# Patient Record
Sex: MALE | Race: WHITE | Employment: FULL TIME
[De-identification: names, ages, dates, MRNs, and addresses within clinical notes are randomized per-mention and may not be internally consistent; named-entity substitution may affect disease eponyms.]

---

## 2022-03-18 PROBLEM — J30.9 ALLERGIC RHINITIS: Status: ACTIVE | Noted: 2020-07-20

## 2022-03-18 PROBLEM — R51.9 NONINTRACTABLE HEADACHE: Status: ACTIVE | Noted: 2022-01-21

## 2022-03-18 PROBLEM — R09.82 POSTNASAL DRIP: Status: ACTIVE | Noted: 2022-01-21

## 2022-03-18 PROBLEM — J32.9 SINUSITIS: Status: ACTIVE | Noted: 2022-01-21

## 2022-03-19 PROBLEM — Z13.228 SCREENING FOR METABOLIC DISORDER: Status: ACTIVE | Noted: 2020-06-22

## 2022-03-19 PROBLEM — E78.5 HYPERLIPIDEMIA: Status: ACTIVE | Noted: 2021-07-13

## 2022-03-19 PROBLEM — J06.9 UPPER RESPIRATORY TRACT INFECTION: Status: ACTIVE | Noted: 2020-07-20

## 2022-03-19 PROBLEM — F98.8 ATTENTION DEFICIT DISORDER (ADD) WITHOUT HYPERACTIVITY: Status: ACTIVE | Noted: 2019-03-24

## 2022-03-19 PROBLEM — Z71.3 DIETARY COUNSELING: Status: ACTIVE | Noted: 2020-06-22

## 2022-03-19 PROBLEM — F33.9 EPISODE OF RECURRENT MAJOR DEPRESSIVE DISORDER (HCC): Status: ACTIVE | Noted: 2020-06-22

## 2022-03-19 PROBLEM — R03.0 ELEVATED BP WITHOUT DIAGNOSIS OF HYPERTENSION: Status: ACTIVE | Noted: 2020-08-18

## 2022-03-19 PROBLEM — D17.9 MULTIPLE LIPOMAS: Status: ACTIVE | Noted: 2017-07-03

## 2022-03-19 PROBLEM — G44.221 CHRONIC TENSION-TYPE HEADACHE, INTRACTABLE: Status: ACTIVE | Noted: 2020-08-18

## 2022-03-20 PROBLEM — Z23 ENCOUNTER FOR IMMUNIZATION: Status: ACTIVE | Noted: 2020-08-18

## 2022-03-20 PROBLEM — R09.81 SINUS CONGESTION: Status: ACTIVE | Noted: 2020-08-18

## 2022-03-20 PROBLEM — Z20.822 CLOSE EXPOSURE TO COVID-19 VIRUS: Status: ACTIVE | Noted: 2020-06-22

## 2022-05-31 ENCOUNTER — TELEPHONE (OUTPATIENT)
Dept: PRIMARY CARE CLINIC | Facility: CLINIC | Age: 47
End: 2022-05-31

## 2022-05-31 NOTE — TELEPHONE ENCOUNTER
PT next apt on 06- pt is needing to be seen before 9 am do to his job , please call pt back to confirm apt thank you.

## 2022-06-14 ENCOUNTER — TELEPHONE (OUTPATIENT)
Dept: BEHAVIORAL/MENTAL HEALTH CLINIC | Age: 47
End: 2022-06-14

## 2022-06-14 NOTE — TELEPHONE ENCOUNTER
Received signed records request for the last 6-8 visit records, current medication list, and formal diagnostic test for ADHD to be sent to [facility] (fax) [phone]

## 2022-06-15 NOTE — TELEPHONE ENCOUNTER
Did not do a formal diagnostic test for ADHD but will send the records. Will print the records in my office.  Thanks

## 2022-06-17 ENCOUNTER — TELEMEDICINE (OUTPATIENT)
Dept: BEHAVIORAL/MENTAL HEALTH CLINIC | Age: 47
End: 2022-06-17

## 2022-06-17 ENCOUNTER — TELEPHONE (OUTPATIENT)
Dept: BEHAVIORAL/MENTAL HEALTH CLINIC | Age: 47
End: 2022-06-17

## 2022-06-17 DIAGNOSIS — F33.42 RECURRENT MAJOR DEPRESSIVE DISORDER, IN FULL REMISSION (HCC): ICD-10-CM

## 2022-06-17 DIAGNOSIS — F98.8 ATTENTION DEFICIT DISORDER, UNSPECIFIED HYPERACTIVITY PRESENCE: Primary | ICD-10-CM

## 2022-06-17 DIAGNOSIS — F41.9 ANXIETY DISORDER, UNSPECIFIED TYPE: ICD-10-CM

## 2022-06-17 DIAGNOSIS — F43.10 PTSD (POST-TRAUMATIC STRESS DISORDER): ICD-10-CM

## 2022-06-17 PROCEDURE — 99214 OFFICE O/P EST MOD 30 MIN: CPT | Performed by: PSYCHIATRY & NEUROLOGY

## 2022-06-17 ASSESSMENT — PATIENT HEALTH QUESTIONNAIRE - PHQ9
5. POOR APPETITE OR OVEREATING: 0
6. FEELING BAD ABOUT YOURSELF - OR THAT YOU ARE A FAILURE OR HAVE LET YOURSELF OR YOUR FAMILY DOWN: 0
8. MOVING OR SPEAKING SO SLOWLY THAT OTHER PEOPLE COULD HAVE NOTICED. OR THE OPPOSITE, BEING SO FIGETY OR RESTLESS THAT YOU HAVE BEEN MOVING AROUND A LOT MORE THAN USUAL: 0
3. TROUBLE FALLING OR STAYING ASLEEP: 0
10. IF YOU CHECKED OFF ANY PROBLEMS, HOW DIFFICULT HAVE THESE PROBLEMS MADE IT FOR YOU TO DO YOUR WORK, TAKE CARE OF THINGS AT HOME, OR GET ALONG WITH OTHER PEOPLE: 0
SUM OF ALL RESPONSES TO PHQ QUESTIONS 1-9: 0
SUM OF ALL RESPONSES TO PHQ QUESTIONS 1-9: 0
SUM OF ALL RESPONSES TO PHQ9 QUESTIONS 1 & 2: 0
4. FEELING TIRED OR HAVING LITTLE ENERGY: 0
1. LITTLE INTEREST OR PLEASURE IN DOING THINGS: 0
7. TROUBLE CONCENTRATING ON THINGS, SUCH AS READING THE NEWSPAPER OR WATCHING TELEVISION: 0
SUM OF ALL RESPONSES TO PHQ QUESTIONS 1-9: 0
2. FEELING DOWN, DEPRESSED OR HOPELESS: 0
SUM OF ALL RESPONSES TO PHQ QUESTIONS 1-9: 0
9. THOUGHTS THAT YOU WOULD BE BETTER OFF DEAD, OR OF HURTING YOURSELF: 0

## 2022-06-17 ASSESSMENT — ANXIETY QUESTIONNAIRES
5. BEING SO RESTLESS THAT IT IS HARD TO SIT STILL: 2
2. NOT BEING ABLE TO STOP OR CONTROL WORRYING: 1
3. WORRYING TOO MUCH ABOUT DIFFERENT THINGS: 1
7. FEELING AFRAID AS IF SOMETHING AWFUL MIGHT HAPPEN: 0
GAD7 TOTAL SCORE: 4
4. TROUBLE RELAXING: 0
1. FEELING NERVOUS, ANXIOUS, OR ON EDGE: 0
IF YOU CHECKED OFF ANY PROBLEMS ON THIS QUESTIONNAIRE, HOW DIFFICULT HAVE THESE PROBLEMS MADE IT FOR YOU TO DO YOUR WORK, TAKE CARE OF THINGS AT HOME, OR GET ALONG WITH OTHER PEOPLE: NOT DIFFICULT AT ALL
6. BECOMING EASILY ANNOYED OR IRRITABLE: 0

## 2022-06-17 NOTE — TELEPHONE ENCOUNTER
Fax to [facility] did not go through. Patient spoke with Dr Violet Cruz today and she advised that it is not necessary to send the records. Will not try to refax.

## 2022-06-17 NOTE — LETTER
[facility] [address] PRIMARY CARE [facility] [address] [address]  Phone: [phone]  Fax: [phone]    Maria Elena Seaman MD        June 17, 2022     Patient: Lord Kingston   YOB: 1975                Current Outpatient Medications   Medication Sig Dispense Refill    NONFORMULARY Athletic Greens      lisdexamfetamine (VYVANSE) 70 MG capsule Take 70 mg by mouth daily.  ZINC PO Take by mouth (Patient not taking: Reported on 6/17/2022)      amphetamine-dextroamphetamine (ADDERALL) 10 MG tablet Take 10 mg by mouth 2 times daily. (Patient not taking: Reported on 6/17/2022)      ascorbic acid (VITAMIN C) 1000 MG tablet Take 1,000 mg by mouth daily (Patient not taking: Reported on 6/17/2022)      cyanocobalamin 100 MCG tablet Take 100 mcg by mouth daily (Patient not taking: Reported on 6/17/2022)      ergocalciferol (ERGOCALCIFEROL) 1.25 MG (48975 UT) capsule Take 50,000 Units by mouth (Patient not taking: Reported on 6/17/2022)      loratadine-pseudoephedrine (CLARITIN-D 24HR)  MG per extended release tablet Take 1 tablet by mouth daily as needed (Patient not taking: Reported on 6/17/2022)       No current facility-administered medications for this visit.

## 2022-06-17 NOTE — PROGRESS NOTES
Patient:  Rosa M Magdaleno  Age:  42 y.o.  :  1975     SEX:  male MRN:  [id number]     RACE: [unfilled]     SEEN:  [x]  PATIENT  []  SPOUSE []  OTHER:              PHQ-9  2022 3/22/2022 3/22/2022   Little interest or pleasure in doing things 0 - -   Little interest or pleasure in doing things - 0 0   Feeling down, depressed, or hopeless 0 - -   Trouble falling or staying asleep, or sleeping too much 0 - -   Trouble falling or staying asleep, or sleeping too much - - 0   Feeling tired or having little energy 0 - -   Feeling tired or having little energy - - 0   Poor appetite or overeating 0 - -   Poor appetite, weight loss, or overeating - - 0   Feeling bad about yourself - or that you are a failure or have let yourself or your family down 0 - -   Feeling bad about yourself - or that you are a failure or have let yourself or your family down - - 0   Trouble concentrating on things, such as reading the newspaper or watching television 0 - -   Trouble concentrating on things such as school, work, reading, or watching TV - - 0   Moving or speaking so slowly that other people could have noticed. Or the opposite - being so fidgety or restless that you have been moving around a lot more than usual 0 - -   Moving or speaking so slowly that other people could have noticed; or the opposite being so fidgety that others notice - - 0   Thoughts that you would be better off dead, or of hurting yourself in some way 0 - -   Thoughts of being better off dead, or hurting yourself in some way - - 0   PHQ-2 Score 0 - -   Total Score PHQ 2 - 0 0   PHQ-9 Total Score 0 - -   PHQ 9 Score - - 0   If you checked off any problems, how difficult have these problems made it for you to do your work, take care of things at home, or get along with other people?  0 - -   How difficult have these problems made it for you to do your work, take care of your home and get along with others - - Not difficult at all       BRENDON-7 SCREENING 6/17/2022   Feeling nervous, anxious, or on edge Not at all   Not being able to stop or control worrying Several days   Worrying too much about different things Several days   Trouble relaxing Not at all   Being so restless that it is hard to sit still More than half the days   Becoming easily annoyed or irritable Not at all   Feeling afraid as if something awful might happen Not at all   BRENDON-7 Total Score 4   How difficult have these problems made it for you to do your work, take care of things at home, or get along with other people? Not difficult at all        I was at home while conducting this encounter. Consent:  He and/or his healthcare decision maker is aware that this patient-initiated Telehealth encounter is a billable service, with coverage as determined by his insurance carrier. He is aware that he may receive a bill and has provided verbal consent to proceed: YesPatient identification was verified, and a caregiver was present when appropriate. The patient was located in a state where the provider was credentialed to provide care. This virtual visit was conducted via [place]. Pursuant to the emergency declaration under the [facility], [facility] waiver authority and the [employer] Act, this Virtual  Visit was conducted to reduce the patient's risk of exposure to COVID-19 and provide continuity of care for an established patient. Services were provided through a video synchronous discussion virtually to substitute for in-person clinic visit. Due to this being a TeleHealth evaluation, many elements of the physical examination are unable to be assessed. Total Time: minutes: 11-20 minutes. Chief complaint:  Pt says he has been doing well on Vyvanse. Subjective:  Seen virtually for follow-up. States things are going pretty well. He is doing well on Vyvanse.   States he saw a new primary care physician at [facility] and his blood pressure was high in January. They referred him to their psychiatrist which she does not want to change to a psychiatrist at [facility]. States he is trying not to drink. Drinks socially and to limit. He is a runner and has been exercising every day. Him and his partner have changed diets so cut out on sodium. They are not very big unprocessed foods and have cut down red meat. Partner had a bad injury recently and is recovering from that. He is attending physical therapy and seeing a therapist for addiction. They are getting along well. Supportive psychotherapy provided. Denies suicidal ideation/homicidal ideations. Denies symptoms psychosis. Continue Vyvanse at current dose. He had an EKG done in January work which was within normal limits. States has been keeping a log of blood pressure and its been normal.    Patient Active Problem List   Diagnosis    Sinusitis    Postnasal drip    Allergic rhinitis    Nonintractable headache    Elevated BP without diagnosis of hypertension    Attention deficit disorder (ADD) without hyperactivity    Screening for metabolic disorder    Episode of recurrent major depressive disorder ([facility] [address]    Upper respiratory tract infection    Chronic tension-type headache, intractable    Hyperlipidemia    Dietary counseling    Multiple lipomas    Sinus congestion    Close exposure to COVID-19 virus    Encounter for immunization       Denies palpitation,SOB, Chest pain, headaches. In no acute distress. MEDICATION REVIEW:    Current Medications:    Current Outpatient Medications   Medication Sig    NONFORMULARY Athletic Greens    lisdexamfetamine (VYVANSE) 70 MG capsule Take 1 capsule by mouth daily for 30 days.  ZINC PO Take by mouth (Patient not taking: Reported on 6/17/2022)    amphetamine-dextroamphetamine (ADDERALL) 10 MG tablet Take 10 mg by mouth 2 times daily.  (Patient not taking: Reported on 6/17/2022)    ascorbic acid (VITAMIN C) 1000 MG tablet Take 1,000 mg by mouth daily (Patient not taking: Reported on 6/17/2022)    cyanocobalamin 100 MCG tablet Take 100 mcg by mouth daily (Patient not taking: Reported on 6/17/2022)    ergocalciferol (ERGOCALCIFEROL) 1.25 MG (10375 UT) capsule Take 50,000 Units by mouth (Patient not taking: Reported on 6/17/2022)    loratadine-pseudoephedrine (CLARITIN-D 24HR)  MG per extended release tablet Take 1 tablet by mouth daily as needed (Patient not taking: Reported on 6/17/2022)     No current facility-administered medications for this visit. Allergies   Allergen Reactions    Escitalopram Other (See Comments)     Headaches     Bupropion Other (See Comments)     headache    Cyclobenzaprine Itching and Other (See Comments)     Also some RLS    Other      cats       Past Medical History, Past Surgical History, Family history, Social History, and Medications were all reviewed with the patient today and updated as necessary.      Compliant with medication: Yes   Side effects from medications:  No     EXAMINATION  Musculoskeletal    GAIT AND STATION   [x] WNL   [] RESTRICTED   [] UNSTEADY WALK        [] ABNORMAL   [] UNBALANCED         PSYCHIATRIC     GENERAL APPEARANCE:   [x]  WELL GROOMED []     DISHEVELED   []  UNKEMPT      []  UNUSUAL/BIZZARE    [] WNL       ATTITUDE:   [x] COOPERATIVE   [] GUARDED   [] SUSPICIOUS      [] HOSTILE                            BEHAVIOR:   [x] CALM   [] HYPERACTIVE   [] MANNERISMS      [] BIZZARE         SPEECH:   [x] NORMAL FOR CLIENT   [] SPONTANEOUS   [] SLURRED   [] WHISPERING      [] LOUD   [] PRESSURED   [] ARTICULATE       EYE CONTACT:   [x] WNL   [] BLANK STARE   [] INTENSE      [] AVOIDANT         MOOD:   [x] EUTHYMIC   [] ANXIOUS   [] DEPRESSED      [] IRRITABLE   [] ANGRY   [] APATHETIC     AFFECT:   [x] CONGRUENT WITH MOOD   [] FLAT   [] CONSTRICTED      [] INAPPROPRIATE   [] LABILE           THOUGHT PROCESS:   [x] LOGICAL/GOAL-DIRECTED [] FOI   [] CIRCUMSANTIAL      [] INCOHERENT   [] TANGENTIAL   [] CONCRETE      [] PERSEVERATION           THOUGHT CONTENT:                DELUSIONS  [x] DENIES  [] GRANDIOSE  [] PERSECUTORY  [] Muslim  [] REFERENCE   HALLUCINATIONS  [x] DENIES  [] AUDITORY  [] VISUAL  [] OLFACTORY  [] TACTILE     [] GUSTATORY  [] SOMATIC         OBSESSIONS  [x] DENIES  [] PRESENT         SUICIDAL IDEATION  [x] DENIES  [] PRESENT W/O PLAN  [] PRESENT W/ PLAN       HOMICIDAL IDEATION  [x] DENIES  [] PRESENT W/O PLAN  [] PRESENT W/ PLAN           JUDGEMENT:   [x] GOOD   [] FAIR   [] POOR   INSIGHT:   [x] GOOD   [] FAIR   [] POOR     COGNITION:           SENSORIUM:   [x] ALERT   [] CLOUDED   [] DROWSY     ORIENTATION:   [x] INTACT   [] TIME:  PLACE  PERSON   RECENT & REMOTE MEMORY:   [] NORMAL   [x] OTHER:                  ATTENTION:   [x] INTACT   [] MILD IMPAIRMENT   [] SEVERE IMPAIRMENT     CONCENTRATION:   [x] INTACT   [] MILD IMPAIRMENT   [] SEVERE IMPAIRMENT     LANGUAGE:   [x] AVERAGE   [] ABOVE AVERAGE   [] BELOW AVERAGE     FUND OF KNOWLEDGE:   [] UNABLE TO ASSESS AT THIS TIME   [x] AVERAGE   [] ABOVE AVERAGE   [] BELOW AVERAGE      [] GOOD TO EXCELLENT KNOWLEDGE OF CURRENT EVENTS   [] POOR TO NO KNLEDGE OF CURRENT EVENTS           ABNORMAL MOVEMENTS:   [x] NONE   [] TICS   [] TREMORS   [] BIZZARE      [] FACE   [] TRUNK   [] EXTREMETIES   [] GESTURES        SLEEP:   [x] GOOD   [] FAIR   [] POOR      MUSCLE STRENGTH AND TONE   [x] WNL   [] ATROPHY   [] SPASTIC        [] FLACCID   [] COGWHEEL         Diagnoses/Impressions:    ICD-10-CM    1. Attention deficit disorder, unspecified hyperactivity presence  F98.8 lisdexamfetamine (VYVANSE) 70 MG capsule   2. Anxiety disorder, unspecified type  F41.9    3. Recurrent major depressive disorder, in full remission ([facility] [address]  F33.42    4.  PTSD (post-traumatic stress disorder)  F43.10        TREATMENT GOALS:  Symptom reduction, Medication adherence, maintain therapeutic of meds, risk of bleeding, risk of seizures, addiction potential, memory impairment, high blood pressure, dizziness, drowsiness, sedation , risk of falls, Risk & benefits discussed: including but not limited to possible off-label medication usage. [x] Follow MSE for sxs improvement     I have reviewed the patients controlled substance prescription history, as maintained in the [place] prescription monitoring program, so that the prescription(s) for a  controlled substance can be given. Recommendations and Referrals: Follow up with : MD, requires monitoring of response to medication, requires monitoring of medication side effects. Time until next PMA:     Follow up with Mental Health Clinicians: psychotherapy interventions, improve level of functioning, monitoring to prevent decompensation /hospitalization, monitoring to maintain therapeutic gains, symptoms (resolving and controlled)           Psychotherapy note:                                __10_ Minutes of psychotherapy     [x]  Supportive psychotherapy, Patient discussed certain situational and personal stressors ongoing in his life at this time, weight management d/w the patient. Sleep hygiene d/w patient. Patient allowed to vent out his emotions. Scenarios were reviewed using role playing and CBT techniques in order to increase insight and decrease anxiety. []  Disposition planning      []  Dangerous and will not contract for safety in the community    **Pateint has been notified: They are to call 911 or go to their nearest E.R. if they are experiencing a medical emergency or suicidal ideations/homicidal ideations. **  All ancillary documentation entered reviewed by provider. PLEASE NOTE:  This document has been produced in part or whole using voice recognition software. Proofread however unrecognized errors in transcription may be present.         Manolo Pack MD

## 2022-06-23 ENCOUNTER — TELEPHONE (OUTPATIENT)
Dept: BEHAVIORAL/MENTAL HEALTH CLINIC | Age: 47
End: 2022-06-23

## 2022-07-14 ENCOUNTER — TELEPHONE (OUTPATIENT)
Dept: PRIMARY CARE CLINIC | Facility: CLINIC | Age: 47
End: 2022-07-14

## 2022-07-14 NOTE — TELEPHONE ENCOUNTER
----- Message from Melonie Melendrez sent at 7/13/2022  9:41 AM EDT -----  Subject: Medication Problem    Medication: lisdexamfetamine (VYVANSE) 70 MG capsule  Dosage: 70 mg capsule  Ordering Provider: Michael Soares    Question/Problem: Assistance that was applied for with nurse Eron Atkins was   not approved and patient needs a different medication prescribed that will   be a lower cost.    Pharmacy: [facility] [address] [place] [address] [place] Jackson, [address] [place] [address] [phone]    ---------------------------------------------------------------------------  --------------  [address]  [phone]; OK to leave message on voicemail  ---------------------------------------------------------------------------  --------------    SCRIPT ANSWERS  Relationship to Patient: Self

## 2022-07-15 NOTE — TELEPHONE ENCOUNTER
Have spoken with patient and with Marva Jung. Jen needed a box filled in on the application, refaxed the application to them. Followed up this morning and it is in process still. They advised to check back end of day. Notified patient.

## 2022-07-27 ENCOUNTER — TELEPHONE (OUTPATIENT)
Dept: BEHAVIORAL/MENTAL HEALTH CLINIC | Age: 47
End: 2022-07-27

## 2022-07-27 NOTE — TELEPHONE ENCOUNTER
Called help at hand patient assistance to follow up on application for vyvanse. Was advised it was approved. Followed up with patient, he was not able to get his medication. He advised the pharmacy was being difficult and not processing it as it should be. ID [id number]  BIN [id number]  PCN [id number]  GRP [id number]    Customer service: [phone]    I called the pharmacy and provided them the information and they processed it and advised it is ready for  and no charge. Notified patient.

## 2022-09-02 ENCOUNTER — TELEPHONE (OUTPATIENT)
Dept: PRIMARY CARE CLINIC | Facility: CLINIC | Age: 47
End: 2022-09-02

## 2022-09-02 NOTE — TELEPHONE ENCOUNTER
Returned call to patient. Has had a lot of increased life altering anxiety and depression. Would like to possibly add a depression medication and something for anxiety at this point. Stated that he has tried this in the past but never gave it long enough to get into his system. Has allergy to Wellbutrin and Lexapro. Patient will need refill sent for vyvanse to last until appt on 9/20 if he is not seen sooner. Pharmacy was also changed to [facility] going forward. Denies SI/HI. Provided with crisis line information in the event of emergency.

## 2022-09-03 DIAGNOSIS — F98.8 ATTENTION DEFICIT DISORDER, UNSPECIFIED HYPERACTIVITY PRESENCE: ICD-10-CM

## 2022-09-03 RX ORDER — SERTRALINE HYDROCHLORIDE 25 MG/1
TABLET, FILM COATED ORAL
Qty: 30 TABLET | Refills: 3 | Status: SHIPPED | OUTPATIENT
Start: 2022-09-03 | End: 2022-09-20 | Stop reason: SDUPTHER

## 2022-09-20 ENCOUNTER — OFFICE VISIT (OUTPATIENT)
Dept: BEHAVIORAL/MENTAL HEALTH CLINIC | Age: 47
End: 2022-09-20
Payer: COMMERCIAL

## 2022-09-20 VITALS
BODY MASS INDEX: 25.37 KG/M2 | SYSTOLIC BLOOD PRESSURE: 126 MMHG | HEIGHT: 73 IN | HEART RATE: 83 BPM | TEMPERATURE: 98 F | DIASTOLIC BLOOD PRESSURE: 78 MMHG | OXYGEN SATURATION: 98 % | WEIGHT: 191.4 LBS

## 2022-09-20 DIAGNOSIS — F33.42 RECURRENT MAJOR DEPRESSIVE DISORDER, IN FULL REMISSION (HCC): Primary | ICD-10-CM

## 2022-09-20 DIAGNOSIS — F41.9 ANXIETY DISORDER, UNSPECIFIED TYPE: ICD-10-CM

## 2022-09-20 DIAGNOSIS — F98.8 ATTENTION DEFICIT DISORDER, UNSPECIFIED HYPERACTIVITY PRESENCE: ICD-10-CM

## 2022-09-20 DIAGNOSIS — F43.10 PTSD (POST-TRAUMATIC STRESS DISORDER): ICD-10-CM

## 2022-09-20 PROCEDURE — 99214 OFFICE O/P EST MOD 30 MIN: CPT | Performed by: PSYCHIATRY & NEUROLOGY

## 2022-09-20 PROCEDURE — G8427 DOCREV CUR MEDS BY ELIG CLIN: HCPCS | Performed by: PSYCHIATRY & NEUROLOGY

## 2022-09-20 ASSESSMENT — ANXIETY QUESTIONNAIRES
7. FEELING AFRAID AS IF SOMETHING AWFUL MIGHT HAPPEN: 0
4. TROUBLE RELAXING: 0
2. NOT BEING ABLE TO STOP OR CONTROL WORRYING: 1
GAD7 TOTAL SCORE: 2
3. WORRYING TOO MUCH ABOUT DIFFERENT THINGS: 1
5. BEING SO RESTLESS THAT IT IS HARD TO SIT STILL: 0
1. FEELING NERVOUS, ANXIOUS, OR ON EDGE: 0
IF YOU CHECKED OFF ANY PROBLEMS ON THIS QUESTIONNAIRE, HOW DIFFICULT HAVE THESE PROBLEMS MADE IT FOR YOU TO DO YOUR WORK, TAKE CARE OF THINGS AT HOME, OR GET ALONG WITH OTHER PEOPLE: NOT DIFFICULT AT ALL
6. BECOMING EASILY ANNOYED OR IRRITABLE: 0

## 2022-09-20 ASSESSMENT — PATIENT HEALTH QUESTIONNAIRE - PHQ9
10. IF YOU CHECKED OFF ANY PROBLEMS, HOW DIFFICULT HAVE THESE PROBLEMS MADE IT FOR YOU TO DO YOUR WORK, TAKE CARE OF THINGS AT HOME, OR GET ALONG WITH OTHER PEOPLE: 0
8. MOVING OR SPEAKING SO SLOWLY THAT OTHER PEOPLE COULD HAVE NOTICED. OR THE OPPOSITE, BEING SO FIGETY OR RESTLESS THAT YOU HAVE BEEN MOVING AROUND A LOT MORE THAN USUAL: 0
SUM OF ALL RESPONSES TO PHQ QUESTIONS 1-9: 3
SUM OF ALL RESPONSES TO PHQ QUESTIONS 1-9: 3
1. LITTLE INTEREST OR PLEASURE IN DOING THINGS: 0
9. THOUGHTS THAT YOU WOULD BE BETTER OFF DEAD, OR OF HURTING YOURSELF: 0
SUM OF ALL RESPONSES TO PHQ QUESTIONS 1-9: 3
3. TROUBLE FALLING OR STAYING ASLEEP: 0
7. TROUBLE CONCENTRATING ON THINGS, SUCH AS READING THE NEWSPAPER OR WATCHING TELEVISION: 2
6. FEELING BAD ABOUT YOURSELF - OR THAT YOU ARE A FAILURE OR HAVE LET YOURSELF OR YOUR FAMILY DOWN: 0
SUM OF ALL RESPONSES TO PHQ QUESTIONS 1-9: 3
5. POOR APPETITE OR OVEREATING: 0
4. FEELING TIRED OR HAVING LITTLE ENERGY: 0
2. FEELING DOWN, DEPRESSED OR HOPELESS: 1
SUM OF ALL RESPONSES TO PHQ9 QUESTIONS 1 & 2: 1

## 2022-09-20 NOTE — PROGRESS NOTES
Patient:  Vinicio Marsh  Age:  42 y.o.  :  1975     SEX:  male MRN:  [id number]     RACE: White (non-)     SEEN:  [x]  PATIENT  []  SPOUSE []  OTHER:              PHQ-9  2022 2022 3/22/2022   Little interest or pleasure in doing things 0 0 -   Little interest or pleasure in doing things - - 0   Feeling down, depressed, or hopeless 1 0 -   Trouble falling or staying asleep, or sleeping too much 0 0 -   Trouble falling or staying asleep, or sleeping too much - - -   Feeling tired or having little energy 0 0 -   Feeling tired or having little energy - - -   Poor appetite or overeating 0 0 -   Poor appetite, weight loss, or overeating - - -   Feeling bad about yourself - or that you are a failure or have let yourself or your family down 0 0 -   Feeling bad about yourself - or that you are a failure or have let yourself or your family down - - -   Trouble concentrating on things, such as reading the newspaper or watching television 2 0 -   Trouble concentrating on things such as school, work, reading, or watching TV - - -   Moving or speaking so slowly that other people could have noticed.  Or the opposite - being so fidgety or restless that you have been moving around a lot more than usual 0 0 -   Moving or speaking so slowly that other people could have noticed; or the opposite being so fidgety that others notice - - -   Thoughts that you would be better off dead, or of hurting yourself in some way 0 0 -   Thoughts of being better off dead, or hurting yourself in some way - - -   PHQ-2 Score 1 0 -   Total Score PHQ 2 - - 0   PHQ-9 Total Score 3 0 -   PHQ 9 Score - - -   If you checked off any problems, how difficult have these problems made it for you to do your work, take care of things at home, or get along with other people? 0 0 -   How difficult have these problems made it for you to do your work, take care of your home and get along with others - - -       BRENDON-7 SCREENING 9/20/2022 6/17/2022   Feeling nervous, anxious, or on edge Not at all Not at all   Not being able to stop or control worrying Several days Several days   Worrying too much about different things Several days Several days   Trouble relaxing Not at all Not at all   Being so restless that it is hard to sit still Not at all More than half the days   Becoming easily annoyed or irritable Not at all Not at all   Feeling afraid as if something awful might happen Not at all Not at all   BRENDON-7 Total Score 2 4   How difficult have these problems made it for you to do your work, take care of things at home, or get along with other people? Not difficult at all Not difficult at all          Chief complaint:  Pt says he has been doing well on the current medications for the anxiety    Subjective:  Seen for follow-up. States doing well on current medications for depression and anxiety. Has not been drinking. Last drink was between August 25 and 29. His partner drinks a lot. They went for marriage counseling for 2 weeks. Patient has been attending individual therapy for 1 year with D[facility] [address]. They are doing active therapy trying nontraditional methods including mindfulness and meditation. Runs and exercises 3-5 times a week and attends gym. Feels strong and healthy. Not beating up on himself. Supportive psychotherapy provided. Patient allowed to vent his emotions. He denies suicidal ideation/homicidal ideations. Denies symptoms of psychosis.     Patient Active Problem List   Diagnosis    Sinusitis    Postnasal drip    Allergic rhinitis    Nonintractable headache    Elevated BP without diagnosis of hypertension    Attention deficit disorder (ADD) without hyperactivity    Screening for metabolic disorder    Episode of recurrent major depressive disorder ([facility] [address]    Upper respiratory tract infection    Chronic tension-type headache, intractable    Hyperlipidemia    Dietary counseling    Multiple lipomas    Sinus congestion    Close exposure to COVID-19 virus    Encounter for immunization       Denies palpitation,SOB, Chest pain, headaches. In no acute distress. MEDICATION REVIEW:    Current Medications:    Current Outpatient Medications   Medication Sig    sertraline (ZOLOFT) 50 MG tablet Take 1 tablet by mouth daily    [START ON 9/28/2022] lisdexamfetamine (VYVANSE) 70 MG capsule Take 1 capsule by mouth daily for 30 days. NONFORMULARY [employer]    ZINC PO Take by mouth (Patient not taking: No sig reported)    ascorbic acid (VITAMIN C) 1000 MG tablet Take 1,000 mg by mouth daily (Patient not taking: No sig reported)    cyanocobalamin 100 MCG tablet Take 100 mcg by mouth daily (Patient not taking: No sig reported)    ergocalciferol (ERGOCALCIFEROL) 1.25 MG (42823 UT) capsule Take 50,000 Units by mouth (Patient not taking: No sig reported)    loratadine-pseudoephedrine (CLARITIN-D 24HR)  MG per extended release tablet Take 1 tablet by mouth daily as needed (Patient not taking: No sig reported)     No current facility-administered medications for this visit. Allergies   Allergen Reactions    Escitalopram Other (See Comments)     Headaches     Bupropion Other (See Comments)     headache    Cyclobenzaprine Itching and Other (See Comments)     Also some RLS    Other      cats       Past Medical History, Past Surgical History, Family history, Social History, and Medications were all reviewed with the patient today and updated as necessary.      Compliant with medication: Yes   Side effects from medications:  No     EXAMINATION  Musculoskeletal    GAIT AND STATION   [x] WNL   [] RESTRICTED   [] UNSTEADY WALK        [] ABNORMAL   [] UNBALANCED         PSYCHIATRIC     GENERAL APPEARANCE:   [x]  WELL GROOMED []     DISHEVELED   []  UNKEMPT      []  UNUSUAL/BIZZARE    [] WNL       ATTITUDE:   [x] COOPERATIVE   [] GUARDED   [] SUSPICIOUS      [] HOSTILE BEHAVIOR:   [x] CALM   [] HYPERACTIVE   [] MANNERISMS      [] BIZZARE         SPEECH:   [x] NORMAL FOR CLIENT   [] SPONTANEOUS   [] SLURRED   [] WHISPERING      [] LOUD   [] PRESSURED   [] ARTICULATE       EYE CONTACT:   [] WNL   [] BLANK STARE   [] INTENSE      [x] AVOIDANT         MOOD:   [x] EUTHYMIC   [] ANXIOUS   [] DEPRESSED      [] IRRITABLE   [] ANGRY   [] APATHETIC     AFFECT:   [x] CONGRUENT WITH MOOD   [] FLAT   [] CONSTRICTED      [] INAPPROPRIATE   [] LABILE           THOUGHT PROCESS:   [x] LOGICAL/GOAL-DIRECTED   [] FOI   [] CIRCUMSANTIAL      [] INCOHERENT   [] TANGENTIAL   [] CONCRETE      [] PERSEVERATION           THOUGHT CONTENT:                DELUSIONS  [x] DENIES  [] GRANDIOSE  [] PERSECUTORY  [] Yarsanism  [] REFERENCE   HALLUCINATIONS  [x] DENIES  [] AUDITORY  [] VISUAL  [] OLFACTORY  [] TACTILE     [] GUSTATORY  [] SOMATIC         OBSESSIONS  [x] DENIES  [] PRESENT         SUICIDAL IDEATION  [x] DENIES  [] PRESENT W/O PLAN  [] PRESENT W/ PLAN       HOMICIDAL IDEATION  [x] DENIES  [] PRESENT W/O PLAN  [] PRESENT W/ PLAN           JUDGEMENT:   [x] GOOD   [] FAIR   [] POOR   INSIGHT:   [x] GOOD   [] FAIR   [] POOR     COGNITION:           SENSORIUM:   [x] ALERT   [] CLOUDED   [] DROWSY     ORIENTATION:   [x] INTACT   [] TIME:  PLACE  PERSON   RECENT & REMOTE MEMORY:   [x] NORMAL   [] OTHER:                  ATTENTION:   [] INTACT   [x] MILD IMPAIRMENT   [] SEVERE IMPAIRMENT     CONCENTRATION:   [] INTACT   [x] MILD IMPAIRMENT   [] SEVERE IMPAIRMENT     LANGUAGE:   [x] AVERAGE   [] ABOVE AVERAGE   [] BELOW AVERAGE     FUND OF KNOWLEDGE:   [] UNABLE TO ASSESS AT THIS TIME   [x] AVERAGE   [] ABOVE AVERAGE   [] BELOW AVERAGE      [] GOOD TO EXCELLENT KNOWLEDGE OF CURRENT EVENTS   [] POOR TO NO KNLEDGE OF CURRENT EVENTS           ABNORMAL MOVEMENTS:   [x] NONE   [] TICS   [] TREMORS   [] BIZZARE      [] FACE   [] TRUNK   [] EXTREMETIES   [] GESTURES        SLEEP:   [x] GOOD   [] FAIR   [] POOR      MUSCLE STRENGTH AND TONE   [x] WNL   [] ATROPHY   [] SPASTIC        [] FLACCID   [] COGWHEEL         Diagnoses/Impressions:    ICD-10-CM    1. Recurrent major depressive disorder, in full remission ([facility] [address]  F33.42       2. Attention deficit disorder, unspecified hyperactivity presence  F98.8 lisdexamfetamine (VYVANSE) 70 MG capsule      3. PTSD (post-traumatic stress disorder)  F43.10       4. Anxiety disorder, unspecified type  F41.9           TREATMENT GOALS:  Symptom reduction, Medication adherence, maintain therapeutic gains    LABS/IMAGING:    []  Ordered [x]  Reviewed []  New Labs Ordered:     LAB  WBC   Date/Time Value Ref Range Status   08/23/2019 02:02 PM 5.7 3.4 - 10.8 x10E3/uL Final     Hemoglobin   Date/Time Value Ref Range Status   08/23/2019 02:02 PM 15.5 13.0 - 17.7 g/dL Final     Hematocrit   Date/Time Value Ref Range Status   08/23/2019 02:02 PM 45.2 37.5 - 51.0 % Final     Platelets   Date/Time Value Ref Range Status   08/23/2019 02:02  150 - 450 x10E3/uL Final     Sodium   Date/Time Value Ref Range Status   04/30/2021 09:34  134 - 144 mmol/L Final     Potassium   Date/Time Value Ref Range Status   04/30/2021 09:34 AM 4.8 3.5 - 5.2 mmol/L Final     Chloride   Date/Time Value Ref Range Status   04/30/2021 09:34  96 - 106 mmol/L Final     CO2   Date/Time Value Ref Range Status   04/30/2021 09:34 AM 25 20 - 29 mmol/L Final     BUN   Date/Time Value Ref Range Status   04/30/2021 09:34 AM 18 6 - 24 mg/dL Final     ALT   Date/Time Value Ref Range Status   04/30/2021 09:34 AM 24 0 - 44 IU/L Final     AST   Date/Time Value Ref Range Status   04/30/2021 09:34 AM 24 0 - 40 IU/L Final     TSH   Date/Time Value Ref Range Status   08/23/2019 02:02 PM 1.370 0.450 - 4.500 uIU/mL Final       Please refer to the lab tab in the epic and care everywhere for the most recent lab results.     Plan:     [x]  Medication ordered: Vyvanse and Zoloft to target depression, anxiety, attention and concentration. [x]  Medication education/counseling provided  Medication dosage and time to take, purpose/expected benefits/risks, common side effects, lab monitoring required and reason, expected length of treatment, risk of no treatment, effects on pregnancy/nursing, financial availability. Educated patient on  side effects/risks/benefits of meds including cardiac arrhythmias, suicidal ideations, priapism, orthostatic hypotension, serotonin syndrome, risk of amalia/hypomania from antidepressants, withdrawals from abrupt discontinuation of meds,  risk of bleeding, risk of seizures, addiction potential, high blood pressure, dizziness, drowsiness, sedation , risk of falls, Risk & benefits discussed: including but not limited to possible off-label medication usage. [x] Follow [facility]SE for sxs improvement     I have reviewed the patients controlled substance prescription history, as maintained in the [place] prescription monitoring program, so that the prescription(s) for a  controlled substance can be given. Recommendations and Referrals: Follow up with : MD, requires monitoring of response to medication, requires monitoring of medication side effects. Time until next PMA:     Follow up with Mental Health Clinicians: psychotherapy interventions, improve level of functioning, monitoring to prevent decompensation /hospitalization, monitoring to maintain therapeutic gains, symptoms (resolving and controlled)           Psychotherapy note:                                __10_ Minutes of psychotherapy     [x]  Supportive psychotherapy, Patient discussed certain situational and personal stressors ongoing in his life at this time, weight management d/w the patient. Sleep hygiene d/w patient. Patient allowed to vent out his emotions. Scenarios were reviewed using role playing and CBT techniques in order to increase insight and decrease anxiety.        []  Disposition planning      []  Dangerous and will not contract for safety in the community    **Pateint has been notified: They are to call 911 or go to their nearest E.R. if they are experiencing a medical emergency or suicidal ideations/homicidal ideations. **  All ancillary documentation entered reviewed by provider. PLEASE NOTE:  This document has been produced in part or whole using voice recognition software. Proofread however unrecognized errors in transcription may be present. MD Sy Lu needs to go to [facility] in [facility] in [address] [facility] and all others need to go to [place].

## 2022-12-15 ENCOUNTER — TELEMEDICINE (OUTPATIENT)
Dept: BEHAVIORAL/MENTAL HEALTH CLINIC | Age: 47
End: 2022-12-15

## 2022-12-15 DIAGNOSIS — F41.9 ANXIETY DISORDER, UNSPECIFIED TYPE: ICD-10-CM

## 2022-12-15 DIAGNOSIS — F43.10 PTSD (POST-TRAUMATIC STRESS DISORDER): ICD-10-CM

## 2022-12-15 DIAGNOSIS — F33.42 RECURRENT MAJOR DEPRESSIVE DISORDER, IN FULL REMISSION (HCC): Primary | ICD-10-CM

## 2022-12-15 DIAGNOSIS — F98.8 ATTENTION DEFICIT DISORDER, UNSPECIFIED HYPERACTIVITY PRESENCE: ICD-10-CM

## 2022-12-15 RX ORDER — SERTRALINE HYDROCHLORIDE 100 MG/1
100 TABLET, FILM COATED ORAL DAILY
Qty: 30 TABLET | Refills: 3 | Status: SHIPPED | OUTPATIENT
Start: 2022-12-15

## 2022-12-15 ASSESSMENT — ANXIETY QUESTIONNAIRES
IF YOU CHECKED OFF ANY PROBLEMS ON THIS QUESTIONNAIRE, HOW DIFFICULT HAVE THESE PROBLEMS MADE IT FOR YOU TO DO YOUR WORK, TAKE CARE OF THINGS AT HOME, OR GET ALONG WITH OTHER PEOPLE: NOT DIFFICULT AT ALL
6. BECOMING EASILY ANNOYED OR IRRITABLE: 0
2. NOT BEING ABLE TO STOP OR CONTROL WORRYING: 0
5. BEING SO RESTLESS THAT IT IS HARD TO SIT STILL: 0
GAD7 TOTAL SCORE: 0
1. FEELING NERVOUS, ANXIOUS, OR ON EDGE: 0
3. WORRYING TOO MUCH ABOUT DIFFERENT THINGS: 0
7. FEELING AFRAID AS IF SOMETHING AWFUL MIGHT HAPPEN: 0
4. TROUBLE RELAXING: 0

## 2022-12-15 ASSESSMENT — PATIENT HEALTH QUESTIONNAIRE - PHQ9
SUM OF ALL RESPONSES TO PHQ QUESTIONS 1-9: 0
SUM OF ALL RESPONSES TO PHQ9 QUESTIONS 1 & 2: 0
5. POOR APPETITE OR OVEREATING: 0
3. TROUBLE FALLING OR STAYING ASLEEP: 0
8. MOVING OR SPEAKING SO SLOWLY THAT OTHER PEOPLE COULD HAVE NOTICED. OR THE OPPOSITE, BEING SO FIGETY OR RESTLESS THAT YOU HAVE BEEN MOVING AROUND A LOT MORE THAN USUAL: 0
SUM OF ALL RESPONSES TO PHQ QUESTIONS 1-9: 0
1. LITTLE INTEREST OR PLEASURE IN DOING THINGS: 0
9. THOUGHTS THAT YOU WOULD BE BETTER OFF DEAD, OR OF HURTING YOURSELF: 0
4. FEELING TIRED OR HAVING LITTLE ENERGY: 0
2. FEELING DOWN, DEPRESSED OR HOPELESS: 0
7. TROUBLE CONCENTRATING ON THINGS, SUCH AS READING THE NEWSPAPER OR WATCHING TELEVISION: 0
6. FEELING BAD ABOUT YOURSELF - OR THAT YOU ARE A FAILURE OR HAVE LET YOURSELF OR YOUR FAMILY DOWN: 0
10. IF YOU CHECKED OFF ANY PROBLEMS, HOW DIFFICULT HAVE THESE PROBLEMS MADE IT FOR YOU TO DO YOUR WORK, TAKE CARE OF THINGS AT HOME, OR GET ALONG WITH OTHER PEOPLE: 0
SUM OF ALL RESPONSES TO PHQ QUESTIONS 1-9: 0
SUM OF ALL RESPONSES TO PHQ QUESTIONS 1-9: 0

## 2022-12-15 NOTE — PROGRESS NOTES
Patient:  Paulino Day  Age:  41 y.o.  :  1975     SEX:  male MRN:  [id number]     RACE: White (non-)     SEEN:  [x]  PATIENT  []  SPOUSE []  OTHER:              PHQ-9  12/15/2022 2022 2022   Little interest or pleasure in doing things 0 0 0   Little interest or pleasure in doing things - - -   Feeling down, depressed, or hopeless 0 1 0   Trouble falling or staying asleep, or sleeping too much 0 0 0   Trouble falling or staying asleep, or sleeping too much - - -   Feeling tired or having little energy 0 0 0   Feeling tired or having little energy - - -   Poor appetite or overeating 0 0 0   Poor appetite, weight loss, or overeating - - -   Feeling bad about yourself - or that you are a failure or have let yourself or your family down 0 0 0   Feeling bad about yourself - or that you are a failure or have let yourself or your family down - - -   Trouble concentrating on things, such as reading the newspaper or watching television 0 2 0   Trouble concentrating on things such as school, work, reading, or watching TV - - -   Moving or speaking so slowly that other people could have noticed.  Or the opposite - being so fidgety or restless that you have been moving around a lot more than usual 0 0 0   Moving or speaking so slowly that other people could have noticed; or the opposite being so fidgety that others notice - - -   Thoughts that you would be better off dead, or of hurting yourself in some way 0 0 0   Thoughts of being better off dead, or hurting yourself in some way - - -   PHQ-2 Score 0 1 0   Total Score PHQ 2 - - -   PHQ-9 Total Score 0 3 0   PHQ 9 Score - - -   If you checked off any problems, how difficult have these problems made it for you to do your work, take care of things at home, or get along with other people? 0 0 0   How difficult have these problems made it for you to do your work, take care of your home and get along with others - - -       BRENDON-7 SCREENING 12/15/2022 9/20/2022 6/17/2022   Feeling nervous, anxious, or on edge Not at all Not at all Not at all   Not being able to stop or control worrying Not at all Several days Several days   Worrying too much about different things Not at all Several days Several days   Trouble relaxing Not at all Not at all Not at all   Being so restless that it is hard to sit still Not at all Not at all More than half the days   Becoming easily annoyed or irritable Not at all Not at all Not at all   Feeling afraid as if something awful might happen Not at all Not at all Not at all   BRENDON-7 Total Score 0 2 4   How difficult have these problems made it for you to do your work, take care of things at home, or get along with other people? Not difficult at all Not difficult at all Not difficult at all        I was in the office while conducting this encounter. Consent:  He and/or his healthcare decision maker is aware that this patient-initiated Telehealth encounter is a billable service, with coverage as determined by his insurance carrier. He is aware that he may receive a bill and has provided verbal consent to proceed: YesPatient identification was verified, and a caregiver was present when appropriate. The patient was located in a state where the provider was credentialed to provide care. This virtual visit was conducted via [address] Pursuant to the emergency declaration under the [facility] waiver authority and the [employer] Act, this Virtual  Visit was conducted to reduce the patient's risk of exposure to COVID-19 and provide continuity of care for an established patient. Services were provided through a video synchronous discussion virtually to substitute for in-person clinic visit. Due to this being a TeleHealth evaluation, many elements of the physical examination are unable to be assessed.      Total Time: minutes: 11-20 minutes. Chief complaint:  Pt says everything is going very well. Subjective:  Seen virtually for follow-up. States everything was going doing well. He is tolerating his medications well. He has increased the dose of the Seroquel to 200 mg daily. Last drink was in August.  States his relationship with his partner is going as well as it can be because there is still substance abuse with him. They both are in marriage counseling at the [facility]. [facility] States his partner is attending  NA meetings. Patient has been exercising some but not able to get outside. States winter weather worsens his depression. He has been working on his diet and has lost weight. Works full-time at [employer]. States his mom is coming to visit for 1 week from [place] for the holidays. Supportive psychotherapy provided. Patient denies suicidal ideation/homicidal ideations. Denies symptoms of psychosis. Patient Active Problem List   Diagnosis    Sinusitis    Postnasal drip    Allergic rhinitis    Nonintractable headache    Elevated BP without diagnosis of hypertension    Attention deficit disorder (ADD) without hyperactivity    Screening for metabolic disorder    Episode of recurrent major depressive disorder ([facility] [address]    Upper respiratory tract infection    Chronic tension-type headache, intractable    Hyperlipidemia    Dietary counseling    Multiple lipomas    Sinus congestion    Close exposure to COVID-19 virus    Encounter for immunization     Denies palpitation,SOB, Chest pain, headaches. In no acute distress. MEDICATION REVIEW:    Current Medications:    Current Outpatient Medications   Medication Sig    sertraline (ZOLOFT) 100 MG tablet Take 1 tablet by mouth daily    [START ON 3/5/2023] lisdexamfetamine (VYVANSE) 70 MG capsule Take 1 capsule by mouth daily for 30 days.     NONFORMULARY [employer]     No current facility-administered medications for this visit. Allergies   Allergen Reactions    Escitalopram Other (See Comments)     Headaches     Bupropion Other (See Comments)     headache    Cyclobenzaprine Itching and Other (See Comments)     Also some RLS    Other      cats       Past Medical History, Past Surgical History, Family history, Social History, and Medications were all reviewed with the patient today and updated as necessary.      Compliant with medication: Yes   Side effects from medications:  No     EXAMINATION  Musculoskeletal    GAIT AND STATION   [x] WNL   [] RESTRICTED   [] UNSTEADY WALK        [] ABNORMAL   [] UNBALANCED         PSYCHIATRIC     GENERAL APPEARANCE:   [x]  WELL GROOMED []     DISHEVELED   []  UNKEMPT      []  UNUSUAL/BIZZARE    [] WNL       ATTITUDE:   [x] COOPERATIVE   [] GUARDED   [] SUSPICIOUS      [] HOSTILE                            BEHAVIOR:   [x] CALM   [] HYPERACTIVE   [] MANNERISMS      [] BIZZARE         SPEECH:   [x] NORMAL FOR CLIENT   [] SPONTANEOUS   [] SLURRED   [] WHISPERING      [] LOUD   [] PRESSURED   [] ARTICULATE       EYE CONTACT:   [x] WNL   [] BLANK STARE   [] INTENSE      [] AVOIDANT         MOOD:   [x] EUTHYMIC   [] ANXIOUS   [] DEPRESSED      [] IRRITABLE   [] ANGRY   [] APATHETIC     AFFECT:   [x] CONGRUENT WITH MOOD   [] FLAT   [] CONSTRICTED      [] INAPPROPRIATE   [] LABILE           THOUGHT PROCESS:   [x] LOGICAL/GOAL-DIRECTED   [] FOI   [] CIRCUMSANTIAL      [] INCOHERENT   [] TANGENTIAL   [] CONCRETE      [] PERSEVERATION           THOUGHT CONTENT:                DELUSIONS  [x] DENIES  [] GRANDIOSE  [] PERSECUTORY  [] Mandaen  [] REFERENCE   HALLUCINATIONS  [x] DENIES  [] AUDITORY  [] VISUAL  [] OLFACTORY  [] TACTILE     [] GUSTATORY  [] SOMATIC         OBSESSIONS  [x] DENIES  [] PRESENT         SUICIDAL IDEATION  [x] DENIES  [] PRESENT W/O PLAN  [] PRESENT W/ PLAN       HOMICIDAL IDEATION  [x] DENIES  [] PRESENT W/O PLAN  [] PRESENT W/ PLAN JUDGEMENT:   [x] GOOD   [] FAIR   [] POOR   INSIGHT:   [x] GOOD   [] FAIR   [] POOR     COGNITION:           SENSORIUM:   [x] ALERT   [] CLOUDED   [] DROWSY     ORIENTATION:   [x] INTACT   [] TIME:  PLACE  PERSON   RECENT & REMOTE MEMORY:   [x] NORMAL   [] OTHER:                  ATTENTION:   [x] INTACT   [] MILD IMPAIRMENT   [] SEVERE IMPAIRMENT     CONCENTRATION:   [x] INTACT   [] MILD IMPAIRMENT   [] SEVERE IMPAIRMENT     LANGUAGE:   [x] AVERAGE   [] ABOVE AVERAGE   [] BELOW AVERAGE     FUND OF KNOWLEDGE:   [] UNABLE TO ASSESS AT THIS TIME   [x] AVERAGE   [] ABOVE AVERAGE   [] BELOW AVERAGE      [] GOOD TO EXCELLENT KNOWLEDGE OF CURRENT EVENTS   [] POOR TO NO KNLEDGE OF CURRENT EVENTS           ABNORMAL MOVEMENTS:   [x] NONE   [] TICS   [] TREMORS   [] BIZZARE      [] FACE   [] TRUNK   [] EXTREMETIES   [] GESTURES        SLEEP:   [x] GOOD   [] FAIR   [] POOR      MUSCLE STRENGTH AND TONE   [x] WNL   [] ATROPHY   [] SPASTIC        [] FLACCID   [] COGWHEEL         Diagnoses/Impressions:    ICD-10-CM    1. [facility]  F33.42       2. Attention deficit disorder, unspecified hyperactivity presence  F98.8 lisdexamfetamine (VYVANSE) 70 MG capsule     DISCONTINUED: lisdexamfetamine (VYVANSE) 70 MG capsule     DISCONTINUED: lisdexamfetamine (VYVANSE) 70 MG capsule      3. PTSD (post-traumatic stress disorder)  F43.10       4.  Anxiety disorder, unspecified type  F41.9           TREATMENT GOALS:  Symptom reduction, Medication adherence, maintain therapeutic gains    LABS/IMAGING:    []  Ordered [x]  Reviewed []  New Labs Ordered:     LAB  WBC   Date/Time Value Ref Range Status   08/23/2019 02:02 PM 5.7 3.4 - 10.8 x10E3/uL Final     Hemoglobin   Date/Time Value Ref Range Status   08/23/2019 02:02 PM 15.5 13.0 - 17.7 g/dL Final     Hematocrit   Date/Time Value Ref Range Status   08/23/2019 02:02 PM 45.2 37.5 - 51.0 % Final     Platelets   Date/Time Value Ref Range Status 08/23/2019 02:02  150 - 450 x10E3/uL Final     Sodium   Date/Time Value Ref Range Status   04/30/2021 09:34  134 - 144 mmol/L Final     Potassium   Date/Time Value Ref Range Status   04/30/2021 09:34 AM 4.8 3.5 - 5.2 mmol/L Final     Chloride   Date/Time Value Ref Range Status   04/30/2021 09:34  96 - 106 mmol/L Final     CO2   Date/Time Value Ref Range Status   04/30/2021 09:34 AM 25 20 - 29 mmol/L Final     BUN   Date/Time Value Ref Range Status   04/30/2021 09:34 AM 18 6 - 24 mg/dL Final     ALT   Date/Time Value Ref Range Status   04/30/2021 09:34 AM 24 0 - 44 IU/L Final     AST   Date/Time Value Ref Range Status   04/30/2021 09:34 AM 24 0 - 40 IU/L Final     TSH   Date/Time Value Ref Range Status   08/23/2019 02:02 PM 1.370 0.450 - 4.500 uIU/mL Final       Please refer to the lab tab in the epic and care everywhere for the most recent lab results. Plan:     [x]  Medication ordered: Zoloft and Vyvanse to target depression, anxiety, attention and concentration. [x]  Medication education/counseling provided  Medication dosage and time to take, purpose/expected benefits/risks, common side effects, lab monitoring required and reason, expected length of treatment, risk of no treatment, effects on pregnancy/nursing, financial availability. Educated patient on  side effects/risks/benefits of meds including  cardiac arrhythmias, suicidal ideations, priapism, orthostatic hypotension, serotonin syndrome, risk of amalia/hypomania from antidepressants, withdrawals from abrupt discontinuation of meds, risk of bleeding, risk of seizures, addiction potential, agitation, psychosis, insomnia, high blood pressure, dizziness, drowsiness, sedation , risk of falls, Risk & benefits discussed: including but not limited to possible off-label medication usage.        [x] Follow MSE for sxs improvement     I have reviewed the patients controlled substance prescription history, as maintained in the [place] prescription monitoring program, so that the prescription(s) for a  controlled substance can be given. Recommendations and Referrals: Follow up with : MD, requires monitoring of response to medication, requires monitoring of medication side effects. Time until next PMA:     Follow up with Mental Health Clinicians: psychotherapy interventions, improve level of functioning, monitoring to prevent decompensation /hospitalization, monitoring to maintain therapeutic gains, symptoms (resolving and controlled)           Psychotherapy note:                                __10_ Minutes of psychotherapy     [x]  Supportive psychotherapy, Patient discussed certain situational and personal stressors ongoing in his life at this time, weight management d/w the patient. Sleep hygiene d/w patient. Patient allowed to vent out his emotions. Scenarios were reviewed using role playing and CBT techniques in order to increase insight and decrease anxiety. []  Disposition planning      []  Dangerous and will not contract for safety in the community    **Pateint has been notified: They are to call 911 or go to their nearest E.R. if they are experiencing a medical emergency or suicidal ideations/homicidal ideations. **  All ancillary documentation entered reviewed by provider. PLEASE NOTE:  This document has been produced in part or whole using voice recognition software. Proofread however unrecognized errors in transcription may be present.         Nancy Farr MD

## 2023-01-09 ENCOUNTER — TELEMEDICINE (OUTPATIENT)
Dept: PRIMARY CARE CLINIC | Facility: CLINIC | Age: 48
End: 2023-01-09
Payer: COMMERCIAL

## 2023-01-09 DIAGNOSIS — E78.5 HYPERLIPIDEMIA, UNSPECIFIED HYPERLIPIDEMIA TYPE: ICD-10-CM

## 2023-01-09 DIAGNOSIS — F33.42 RECURRENT MAJOR DEPRESSIVE DISORDER, IN FULL REMISSION (HCC): ICD-10-CM

## 2023-01-09 DIAGNOSIS — R68.83 CHILLS: ICD-10-CM

## 2023-01-09 DIAGNOSIS — J30.9 ALLERGIC RHINITIS, UNSPECIFIED SEASONALITY, UNSPECIFIED TRIGGER: ICD-10-CM

## 2023-01-09 DIAGNOSIS — Z71.3 DIETARY COUNSELING: ICD-10-CM

## 2023-01-09 DIAGNOSIS — J32.9 SINUSITIS, UNSPECIFIED CHRONICITY, UNSPECIFIED LOCATION: Primary | ICD-10-CM

## 2023-01-09 DIAGNOSIS — J02.9 PHARYNGITIS, UNSPECIFIED ETIOLOGY: ICD-10-CM

## 2023-01-09 PROCEDURE — G8427 DOCREV CUR MEDS BY ELIG CLIN: HCPCS | Performed by: FAMILY MEDICINE

## 2023-01-09 PROCEDURE — 1036F TOBACCO NON-USER: CPT | Performed by: FAMILY MEDICINE

## 2023-01-09 PROCEDURE — 99214 OFFICE O/P EST MOD 30 MIN: CPT | Performed by: FAMILY MEDICINE

## 2023-01-09 PROCEDURE — G8484 FLU IMMUNIZE NO ADMIN: HCPCS | Performed by: FAMILY MEDICINE

## 2023-01-09 PROCEDURE — G8419 CALC BMI OUT NRM PARAM NOF/U: HCPCS | Performed by: FAMILY MEDICINE

## 2023-01-09 RX ORDER — AZITHROMYCIN 500 MG/1
500 TABLET, FILM COATED ORAL DAILY
Qty: 5 TABLET | Refills: 0 | Status: SHIPPED | OUTPATIENT
Start: 2023-01-09 | End: 2023-01-14

## 2023-01-09 SDOH — ECONOMIC STABILITY: FOOD INSECURITY: WITHIN THE PAST 12 MONTHS, YOU WORRIED THAT YOUR FOOD WOULD RUN OUT BEFORE YOU GOT MONEY TO BUY MORE.: NEVER TRUE

## 2023-01-09 SDOH — ECONOMIC STABILITY: TRANSPORTATION INSECURITY
IN THE PAST 12 MONTHS, HAS THE LACK OF TRANSPORTATION KEPT YOU FROM MEDICAL APPOINTMENTS OR FROM GETTING MEDICATIONS?: NO

## 2023-01-09 SDOH — ECONOMIC STABILITY: TRANSPORTATION INSECURITY
IN THE PAST 12 MONTHS, HAS LACK OF TRANSPORTATION KEPT YOU FROM MEETINGS, WORK, OR FROM GETTING THINGS NEEDED FOR DAILY LIVING?: NO

## 2023-01-09 SDOH — ECONOMIC STABILITY: FOOD INSECURITY: WITHIN THE PAST 12 MONTHS, THE FOOD YOU BOUGHT JUST DIDN'T LAST AND YOU DIDN'T HAVE MONEY TO GET MORE.: NEVER TRUE

## 2023-01-09 ASSESSMENT — PATIENT HEALTH QUESTIONNAIRE - PHQ9
SUM OF ALL RESPONSES TO PHQ QUESTIONS 1-9: 0
7. TROUBLE CONCENTRATING ON THINGS, SUCH AS READING THE NEWSPAPER OR WATCHING TELEVISION: 0
9. THOUGHTS THAT YOU WOULD BE BETTER OFF DEAD, OR OF HURTING YOURSELF: 0
5. POOR APPETITE OR OVEREATING: 0
4. FEELING TIRED OR HAVING LITTLE ENERGY: 0
8. MOVING OR SPEAKING SO SLOWLY THAT OTHER PEOPLE COULD HAVE NOTICED. OR THE OPPOSITE, BEING SO FIGETY OR RESTLESS THAT YOU HAVE BEEN MOVING AROUND A LOT MORE THAN USUAL: 0
SUM OF ALL RESPONSES TO PHQ QUESTIONS 1-9: 0
SUM OF ALL RESPONSES TO PHQ9 QUESTIONS 1 & 2: 0
10. IF YOU CHECKED OFF ANY PROBLEMS, HOW DIFFICULT HAVE THESE PROBLEMS MADE IT FOR YOU TO DO YOUR WORK, TAKE CARE OF THINGS AT HOME, OR GET ALONG WITH OTHER PEOPLE: 0
1. LITTLE INTEREST OR PLEASURE IN DOING THINGS: 0
SUM OF ALL RESPONSES TO PHQ QUESTIONS 1-9: 0
3. TROUBLE FALLING OR STAYING ASLEEP: 0
2. FEELING DOWN, DEPRESSED OR HOPELESS: 0
6. FEELING BAD ABOUT YOURSELF - OR THAT YOU ARE A FAILURE OR HAVE LET YOURSELF OR YOUR FAMILY DOWN: 0
SUM OF ALL RESPONSES TO PHQ QUESTIONS 1-9: 0

## 2023-01-09 ASSESSMENT — LIFESTYLE VARIABLES
HOW MANY STANDARD DRINKS CONTAINING ALCOHOL DO YOU HAVE ON A TYPICAL DAY: PATIENT DOES NOT DRINK
HOW OFTEN DO YOU HAVE A DRINK CONTAINING ALCOHOL: NEVER

## 2023-01-09 ASSESSMENT — SOCIAL DETERMINANTS OF HEALTH (SDOH): HOW HARD IS IT FOR YOU TO PAY FOR THE VERY BASICS LIKE FOOD, HOUSING, MEDICAL CARE, AND HEATING?: NOT HARD AT ALL

## 2023-01-09 NOTE — PROGRESS NOTES
[facility] - [address] [place] [address], [facility] Lorenzo Rodríguez   Office : [phone]  Fax : [phone]      Pt was seen by synchronous (real-time) audio-video technology   I was at my home office while conducting this encounter  Pts  healthcare decision maker is aware that this patient-initiated Telehealth encounter is a billable service, with coverage as determined by her insurance carrier. She is aware that she may receive a bill and has provided verbal consent to proceed:         Subjective: The patient is a 45 y.o. male  who presents for f/u on multiple chronic medical conditions-good compliance with medications-pt new to practice -here to establish care-pt here to get routeine labs  . Pt has ADHD--sees psychiatrist--doing well on meds  DEPRESSION-STABLE on med  Allergic rhinitis-stable on med  Hyperlipidemia-diet controled    Pt also here for possible URI/PHARYNGITIS--19 test neg --PT C/OF  productive cough,sorethroat,earache,facial pain  and chest/sinus congestion for few days-worse last 2 days--no fever/cp/sob/wheezing/rash/abd symptoms-pt tried some OTC meds without relief  Pt denies any loss of taste/smell--no abdominal or urinary symptoms  Pt denies any rash      Patient Active Problem List   Diagnosis Code    Multiple lipomas D17.9    Attention deficit disorder (ADD) without hyperactivity F98.8    Close exposure to COVID-19 virus Z20.828    Episode of recurrent major depressive disorder (HCC) F33.9    Screening for metabolic disorder J15.371    Dietary counseling Z71.3    Upper respiratory tract infection J06.9    Allergic rhinitis J30.9       History reviewed. No pertinent past medical history.     Past Surgical History:   Procedure Laterality Date    HX PREMALIG/BENIGN SKIN LESION EXCISION  2004    lipoma right forearm        Social History     Socioeconomic History    Marital status:      Spouse name: Not on file    Number of children: Not on file    Years of education: Not on file    Highest education level: Not on file   Occupational History    Not on file   Social Needs    Financial resource strain: Not on file    Food insecurity     Worry: Not on file     Inability: Not on file    Transportation needs     Medical: Not on file     Non-medical: Not on file   Tobacco Use    Smoking status: Former Smoker     Last attempt to quit: 2015     Years since quittin.5    Smokeless tobacco: Former User     Quit date: 2018   Substance and Sexual Activity    Alcohol use: Yes     Alcohol/week: 4.0 standard drinks     Types: 4 Glasses of wine per week    Drug use: Never    Sexual activity: Not on file   Lifestyle    Physical activity     Days per week: Not on file     Minutes per session: Not on file    Stress: Not on file   Relationships    Social connections     Talks on phone: Not on file     Gets together: Not on file     Attends [employer] service: Not on file     Active member of club or organization: Not on file     Attends meetings of clubs or organizations: Not on file     Relationship status: Not on file    Intimate partner violence     Fear of current or ex partner: Not on file     Emotionally abused: Not on file     Physically abused: Not on file     Forced sexual activity: Not on file   Other Topics Concern    Not on file   Social History Narrative    Not on file       Allergies   Allergen Reactions    Flexeril [Cyclobenzaprine] Itching and Other (comments)     Also some RLS       Current Outpatient Medications   Medication Sig Dispense Refill    [START ON 2020] Lisdexamfetamine (Vyvanse) 70 mg cap Take 1 Cap by mouth daily. Max Daily Amount: 70 mg. 30 Cap 0    buPROPion XL (WELLBUTRIN XL) 150 mg tablet Take 1 Tab by mouth daily. 90 Tab 3    loratadine-pseudoephedrine (CLARITIN-D 24-hour)  mg per tablet Take 1 Tab by mouth daily as needed (allergies). 30 Tab 2    multivitamin with iron tablet Take 1 Tab by mouth daily.       psyllium (METAMUCIL) powd Take 15 mL by mouth daily. Review of Systems   Constitutional: Negative. HENT: Negative. Eyes: Negative. Respiratory: Negative. Cardiovascular: Negative. Gastrointestinal: Negative. Genitourinary: Negative. Musculoskeletal: Negative. Skin: Negative. Neurological: Negative. Endo/Heme/Allergies: Negative. Psychiatric/Behavioral: The patient is nervous/anxious. Objective: There were no vitals taken for this visit. Physical Exam  Constitutional:       Appearance: He is well-developed. HENT:      Head: Normocephalic and atraumatic. Right Ear: External ear normal.      Left Ear: External ear normal.      Nose: Nose and throat mucosa inflammed   Eyes:      Conjunctiva/sclera: Conjunctivae normal.     .   Neck:      Musculoskeletal: Normal range of motion. Pulmonary:      Effort: Pulmonary effort is normal.   Abdominal:      General: Bowel sounds are normal.      Palpations: Abdomen is soft. Musculoskeletal: Normal range of motion. Skin:     General: Skin is warm and dry. Neurological:      Mental Status: He is alert and oriented to person, place, and time. [facility]:         Behavior: Behavior normal.         Judgment: Judgment normal.               ASSESSMENT/PLAN:  1. Sinusitis, unspecified chronicity, unspecified location  Overview:  Steam in halations  Saline nasal cleansing  mucinex as needed  abx  Prednisone  F/u in 2 weeks      Orders:  -     azithromycin (ZITHROMAX) 500 MG tablet; Take 1 tablet by mouth daily for 5 days, Disp-5 tablet, R-0Normal  -     AMB POC RAPID STREP A  -     Comprehensive Metabolic Panel; Future  -     Lipid Panel; Future  2. Pharyngitis, unspecified etiology  -     azithromycin (ZITHROMAX) 500 MG tablet; Take 1 tablet by mouth daily for 5 days, Disp-5 tablet, R-0Normal  -     AMB POC RAPID STREP A  -     Comprehensive Metabolic Panel; Future  -     Lipid Panel; Future  3.  Chills  Comments:  covid test neg  Overview:  covid test neg    Orders:  -     AMB POC RAPID STREP A  -     Comprehensive Metabolic Panel; Future  -     Lipid Panel; Future  4. Recurrent major depressive disorder, in full remission ([facility] [address]  Overview:  Stable on zoloft  Orders:  -     Comprehensive Metabolic Panel; Future  -     Lipid Panel; Future  5. Allergic rhinitis, unspecified seasonality, unspecified trigger  Overview:  As needed loratadine  Seen ENT      Orders:  -     Comprehensive Metabolic Panel; Future  -     Lipid Panel; Future  6. Hyperlipidemia, unspecified hyperlipidemia type  Overview:  Low fat diet  Exercise daily      Orders:  -     Comprehensive Metabolic Panel; Future  -     Lipid Panel; Future  7. Dietary counseling  Overview:  Low calorie/natural diet      Orders:  -     Comprehensive Metabolic Panel; Future  -     Lipid Panel; Future     Orders Placed This Encounter   Procedures    Comprehensive Metabolic Panel     Standing Status:   Future     Standing Expiration Date:   1/9/2024    Lipid Panel     Standing Status:   Future     Standing Expiration Date:   1/9/2024     Order Specific Question:   Is Patient Fasting?/# of Hours     Answer:   0    AMB POC RAPID STREP A      Orders Placed This Encounter   Medications    azithromycin (ZITHROMAX) 500 MG tablet     Sig: Take 1 tablet by mouth daily for 5 days     Dispense:  5 tablet     Refill:  0            No orders of the defined types were placed in this encounter. Due to this being a TeleHealth evaluation, many elements of the physical examination are unable to be assessed. We discussed the expected course, resolution and complications of the diagnosis(es) in detail. Medication risks, benefits, costs, interactions, and alternatives were discussed as indicated. I advised her to contact the office if her condition worsens, changes or fails to improve as anticipated. She expressed understanding with the diagnosis(es) and plan.          Pursuant to the emergency declaration under the [facility], [facility] waiver authority and the [employer] Act, this Virtual  Visit was conducted, with patient's consent, to reduce the patient's risk of exposure to COVID-19 and provide continuity of care for an established patient. Services were provided through a video synchronous discussion virtually to substitute for in-person clinic visit. Follow-up and Dispositions    Return in about  2 weeks or as needed         Stacie Gustafson MD

## 2023-03-22 RX ORDER — SERTRALINE HYDROCHLORIDE 100 MG/1
100 TABLET, FILM COATED ORAL DAILY
Qty: 30 TABLET | Refills: 3 | OUTPATIENT
Start: 2023-03-22

## 2023-03-29 ENCOUNTER — TELEMEDICINE (OUTPATIENT)
Dept: PRIMARY CARE CLINIC | Facility: CLINIC | Age: 48
End: 2023-03-29
Payer: COMMERCIAL

## 2023-03-29 DIAGNOSIS — J32.9 SINUSITIS, UNSPECIFIED CHRONICITY, UNSPECIFIED LOCATION: Primary | ICD-10-CM

## 2023-03-29 DIAGNOSIS — J02.9 PHARYNGITIS, UNSPECIFIED ETIOLOGY: ICD-10-CM

## 2023-03-29 DIAGNOSIS — G44.201 INTRACTABLE TENSION-TYPE HEADACHE, UNSPECIFIED CHRONICITY PATTERN: ICD-10-CM

## 2023-03-29 DIAGNOSIS — R09.82 POSTNASAL DRIP: ICD-10-CM

## 2023-03-29 DIAGNOSIS — R05.1 ACUTE COUGH: ICD-10-CM

## 2023-03-29 DIAGNOSIS — E78.5 HYPERLIPIDEMIA, UNSPECIFIED HYPERLIPIDEMIA TYPE: ICD-10-CM

## 2023-03-29 PROCEDURE — G8427 DOCREV CUR MEDS BY ELIG CLIN: HCPCS | Performed by: FAMILY MEDICINE

## 2023-03-29 PROCEDURE — 1036F TOBACCO NON-USER: CPT | Performed by: FAMILY MEDICINE

## 2023-03-29 PROCEDURE — G8484 FLU IMMUNIZE NO ADMIN: HCPCS | Performed by: FAMILY MEDICINE

## 2023-03-29 PROCEDURE — G8419 CALC BMI OUT NRM PARAM NOF/U: HCPCS | Performed by: FAMILY MEDICINE

## 2023-03-29 PROCEDURE — 99214 OFFICE O/P EST MOD 30 MIN: CPT | Performed by: FAMILY MEDICINE

## 2023-03-29 RX ORDER — BENZONATATE 100 MG/1
100 CAPSULE ORAL 3 TIMES DAILY PRN
Qty: 20 CAPSULE | Refills: 0 | Status: SHIPPED | OUTPATIENT
Start: 2023-03-29 | End: 2023-04-05

## 2023-03-29 RX ORDER — AMOXICILLIN AND CLAVULANATE POTASSIUM 875; 125 MG/1; MG/1
1 TABLET, FILM COATED ORAL 2 TIMES DAILY
Qty: 20 TABLET | Refills: 0 | Status: SHIPPED | OUTPATIENT
Start: 2023-03-29 | End: 2023-04-08

## 2023-03-29 SDOH — ECONOMIC STABILITY: FOOD INSECURITY: WITHIN THE PAST 12 MONTHS, YOU WORRIED THAT YOUR FOOD WOULD RUN OUT BEFORE YOU GOT MONEY TO BUY MORE.: NEVER TRUE

## 2023-03-29 SDOH — ECONOMIC STABILITY: HOUSING INSECURITY
IN THE LAST 12 MONTHS, WAS THERE A TIME WHEN YOU DID NOT HAVE A STEADY PLACE TO SLEEP OR SLEPT IN A SHELTER (INCLUDING NOW)?: NO

## 2023-03-29 SDOH — ECONOMIC STABILITY: FOOD INSECURITY: WITHIN THE PAST 12 MONTHS, THE FOOD YOU BOUGHT JUST DIDN'T LAST AND YOU DIDN'T HAVE MONEY TO GET MORE.: NEVER TRUE

## 2023-03-29 SDOH — ECONOMIC STABILITY: INCOME INSECURITY: HOW HARD IS IT FOR YOU TO PAY FOR THE VERY BASICS LIKE FOOD, HOUSING, MEDICAL CARE, AND HEATING?: NOT HARD AT ALL

## 2023-03-29 ASSESSMENT — PATIENT HEALTH QUESTIONNAIRE - PHQ9
7. TROUBLE CONCENTRATING ON THINGS, SUCH AS READING THE NEWSPAPER OR WATCHING TELEVISION: 0
1. LITTLE INTEREST OR PLEASURE IN DOING THINGS: 0
10. IF YOU CHECKED OFF ANY PROBLEMS, HOW DIFFICULT HAVE THESE PROBLEMS MADE IT FOR YOU TO DO YOUR WORK, TAKE CARE OF THINGS AT HOME, OR GET ALONG WITH OTHER PEOPLE: 0
SUM OF ALL RESPONSES TO PHQ QUESTIONS 1-9: 0
6. FEELING BAD ABOUT YOURSELF - OR THAT YOU ARE A FAILURE OR HAVE LET YOURSELF OR YOUR FAMILY DOWN: 0
9. THOUGHTS THAT YOU WOULD BE BETTER OFF DEAD, OR OF HURTING YOURSELF: 0
SUM OF ALL RESPONSES TO PHQ9 QUESTIONS 1 & 2: 0
8. MOVING OR SPEAKING SO SLOWLY THAT OTHER PEOPLE COULD HAVE NOTICED. OR THE OPPOSITE, BEING SO FIGETY OR RESTLESS THAT YOU HAVE BEEN MOVING AROUND A LOT MORE THAN USUAL: 0
3. TROUBLE FALLING OR STAYING ASLEEP: 0
SUM OF ALL RESPONSES TO PHQ QUESTIONS 1-9: 0
SUM OF ALL RESPONSES TO PHQ QUESTIONS 1-9: 0
5. POOR APPETITE OR OVEREATING: 0
4. FEELING TIRED OR HAVING LITTLE ENERGY: 0
2. FEELING DOWN, DEPRESSED OR HOPELESS: 0
SUM OF ALL RESPONSES TO PHQ QUESTIONS 1-9: 0

## 2023-03-29 NOTE — PROGRESS NOTES
[facility] - [address] [facility], [facility] Lorenzo Rodríguez   Office : [phone]  Fax : [phone]      Pt was seen by synchronous (real-time) audio-video technology   I was at my home office while conducting this encounter  Pts  healthcare decision maker is aware that this patient-initiated Telehealth encounter is a billable service, with coverage as determined by her insurance carrier. She is aware that she may receive a bill and has provided verbal consent to proceed:         Subjective: The patient is a 45 y.o. male  who presents for f/u on Pt also here for possible URI---PT C/OF  productive cough,sorethroat,earache,facial pain,headache and chest/sinus congestion for few days-worse last 2 days--no fever/cp/sob/wheezing/rash/abd symptoms-pt tried some OTC meds without relief  Pt denies any loss of taste/smell--no abdominal or urinary symptoms  Pt denies any rash    multiple chronic medical conditions-good compliance with medications-pt new to practice -here to establish care-pt here to get routeine labs  . Pt has ADHD--sees psychiatrist--doing well on meds  DEPRESSION-STABLE on med  Allergic rhinitis-stable on med  Hyperlipidemia-diet controled        Patient Active Problem List   Diagnosis Code    Multiple lipomas D17.9    Attention deficit disorder (ADD) without hyperactivity F98.8    Close exposure to COVID-19 virus Z20.828    Episode of recurrent major depressive disorder ([facility] [address] F33.9    Screening for metabolic disorder P67.052    Dietary counseling Z71.3    Upper respiratory tract infection J06.9    Allergic rhinitis J30.9       History reviewed. No pertinent past medical history.     Past Surgical History:   Procedure Laterality Date    HX PREMALIG/BENIGN SKIN LESION EXCISION  2004    lipoma right forearm        Social History     Socioeconomic History    Marital status:      Spouse name: Not on file    Number of children: Not on file    Years of education: Not on file    Highest

## 2023-04-05 ENCOUNTER — TELEMEDICINE (OUTPATIENT)
Dept: BEHAVIORAL/MENTAL HEALTH CLINIC | Age: 48
End: 2023-04-05
Payer: COMMERCIAL

## 2023-04-05 DIAGNOSIS — F98.8 ATTENTION DEFICIT DISORDER, UNSPECIFIED HYPERACTIVITY PRESENCE: ICD-10-CM

## 2023-04-05 DIAGNOSIS — F33.42 RECURRENT MAJOR DEPRESSIVE DISORDER, IN FULL REMISSION (HCC): Primary | ICD-10-CM

## 2023-04-05 DIAGNOSIS — F43.10 PTSD (POST-TRAUMATIC STRESS DISORDER): ICD-10-CM

## 2023-04-05 DIAGNOSIS — F41.9 ANXIETY DISORDER, UNSPECIFIED TYPE: ICD-10-CM

## 2023-04-05 PROCEDURE — G8427 DOCREV CUR MEDS BY ELIG CLIN: HCPCS | Performed by: PSYCHIATRY & NEUROLOGY

## 2023-04-05 PROCEDURE — 99214 OFFICE O/P EST MOD 30 MIN: CPT | Performed by: PSYCHIATRY & NEUROLOGY

## 2023-04-05 RX ORDER — SERTRALINE HYDROCHLORIDE 100 MG/1
100 TABLET, FILM COATED ORAL DAILY
Qty: 30 TABLET | Refills: 3 | Status: SHIPPED | OUTPATIENT
Start: 2023-04-05

## 2023-04-05 ASSESSMENT — PATIENT HEALTH QUESTIONNAIRE - PHQ9
1. LITTLE INTEREST OR PLEASURE IN DOING THINGS: 0
10. IF YOU CHECKED OFF ANY PROBLEMS, HOW DIFFICULT HAVE THESE PROBLEMS MADE IT FOR YOU TO DO YOUR WORK, TAKE CARE OF THINGS AT HOME, OR GET ALONG WITH OTHER PEOPLE: 0
9. THOUGHTS THAT YOU WOULD BE BETTER OFF DEAD, OR OF HURTING YOURSELF: 0
7. TROUBLE CONCENTRATING ON THINGS, SUCH AS READING THE NEWSPAPER OR WATCHING TELEVISION: 0
8. MOVING OR SPEAKING SO SLOWLY THAT OTHER PEOPLE COULD HAVE NOTICED. OR THE OPPOSITE, BEING SO FIGETY OR RESTLESS THAT YOU HAVE BEEN MOVING AROUND A LOT MORE THAN USUAL: 0
SUM OF ALL RESPONSES TO PHQ9 QUESTIONS 1 & 2: 0
2. FEELING DOWN, DEPRESSED OR HOPELESS: 0
6. FEELING BAD ABOUT YOURSELF - OR THAT YOU ARE A FAILURE OR HAVE LET YOURSELF OR YOUR FAMILY DOWN: 0
SUM OF ALL RESPONSES TO PHQ QUESTIONS 1-9: 1
5. POOR APPETITE OR OVEREATING: 0
4. FEELING TIRED OR HAVING LITTLE ENERGY: 1
SUM OF ALL RESPONSES TO PHQ QUESTIONS 1-9: 1
3. TROUBLE FALLING OR STAYING ASLEEP: 0

## 2023-04-05 ASSESSMENT — ANXIETY QUESTIONNAIRES
IF YOU CHECKED OFF ANY PROBLEMS ON THIS QUESTIONNAIRE, HOW DIFFICULT HAVE THESE PROBLEMS MADE IT FOR YOU TO DO YOUR WORK, TAKE CARE OF THINGS AT HOME, OR GET ALONG WITH OTHER PEOPLE: NOT DIFFICULT AT ALL
2. NOT BEING ABLE TO STOP OR CONTROL WORRYING: 0
1. FEELING NERVOUS, ANXIOUS, OR ON EDGE: 0
7. FEELING AFRAID AS IF SOMETHING AWFUL MIGHT HAPPEN: 0
4. TROUBLE RELAXING: 0
GAD7 TOTAL SCORE: 0
5. BEING SO RESTLESS THAT IT IS HARD TO SIT STILL: 0
6. BECOMING EASILY ANNOYED OR IRRITABLE: 0
3. WORRYING TOO MUCH ABOUT DIFFERENT THINGS: 0

## 2023-04-05 NOTE — PROGRESS NOTES
Patient:  Army Beltran  Age:  41 y.o.  :  1975     SEX:  male MRN:  [id number]     RACE: White (non-)     SEEN:  [x]  PATIENT  []  SPOUSE []  OTHER:              PHQ-9  2023 3/29/2023 2023   Little interest or pleasure in doing things 0 0 0   Little interest or pleasure in doing things - - -   Feeling down, depressed, or hopeless 0 0 0   Trouble falling or staying asleep, or sleeping too much 0 0 0   Trouble falling or staying asleep, or sleeping too much - - -   Feeling tired or having little energy 1 0 0   Feeling tired or having little energy - - -   Poor appetite or overeating 0 0 0   Poor appetite, weight loss, or overeating - - -   Feeling bad about yourself - or that you are a failure or have let yourself or your family down 0 0 0   Feeling bad about yourself - or that you are a failure or have let yourself or your family down - - -   Trouble concentrating on things, such as reading the newspaper or watching television 0 0 0   Trouble concentrating on things such as school, work, reading, or watching TV - - -   Moving or speaking so slowly that other people could have noticed.  Or the opposite - being so fidgety or restless that you have been moving around a lot more than usual 0 0 0   Moving or speaking so slowly that other people could have noticed; or the opposite being so fidgety that others notice - - -   Thoughts that you would be better off dead, or of hurting yourself in some way 0 0 0   Thoughts of being better off dead, or hurting yourself in some way - - -   PHQ-2 Score 0 0 0   Total Score PHQ 2 - - -   PHQ-9 Total Score 1 0 0   PHQ 9 Score - - -   If you checked off any problems, how difficult have these problems made it for you to do your work, take care of things at home, or get along with other people? 0 0 0   How difficult have these problems made it for you to do your work, take care of your home and get along with others - - -       BRENDON-7 SCREENING

## 2023-07-03 ENCOUNTER — TELEMEDICINE (OUTPATIENT)
Dept: BEHAVIORAL/MENTAL HEALTH CLINIC | Age: 48
End: 2023-07-03
Payer: COMMERCIAL

## 2023-07-03 DIAGNOSIS — F43.10 PTSD (POST-TRAUMATIC STRESS DISORDER): ICD-10-CM

## 2023-07-03 DIAGNOSIS — G25.81 RESTLESS LEG SYNDROME: ICD-10-CM

## 2023-07-03 DIAGNOSIS — F41.9 ANXIETY DISORDER, UNSPECIFIED TYPE: ICD-10-CM

## 2023-07-03 DIAGNOSIS — F98.8 ATTENTION DEFICIT DISORDER, UNSPECIFIED HYPERACTIVITY PRESENCE: ICD-10-CM

## 2023-07-03 DIAGNOSIS — F33.42 RECURRENT MAJOR DEPRESSIVE DISORDER, IN FULL REMISSION (HCC): Primary | ICD-10-CM

## 2023-07-03 PROCEDURE — 99214 OFFICE O/P EST MOD 30 MIN: CPT | Performed by: PSYCHIATRY & NEUROLOGY

## 2023-07-03 PROCEDURE — G8427 DOCREV CUR MEDS BY ELIG CLIN: HCPCS | Performed by: PSYCHIATRY & NEUROLOGY

## 2023-07-03 RX ORDER — METHYLPHENIDATE HYDROCHLORIDE 36 MG/1
36 TABLET ORAL DAILY
Qty: 30 TABLET | Refills: 0 | Status: SHIPPED | OUTPATIENT
Start: 2023-07-13 | End: 2023-08-12

## 2023-07-03 RX ORDER — SERTRALINE HYDROCHLORIDE 100 MG/1
100 TABLET, FILM COATED ORAL DAILY
Qty: 30 TABLET | Refills: 3 | Status: SHIPPED | OUTPATIENT
Start: 2023-07-03

## 2023-07-03 RX ORDER — GABAPENTIN 300 MG/1
600 CAPSULE ORAL
Qty: 60 CAPSULE | Refills: 3 | Status: SHIPPED | OUTPATIENT
Start: 2023-07-03 | End: 2023-10-31

## 2023-07-03 ASSESSMENT — PATIENT HEALTH QUESTIONNAIRE - PHQ9
SUM OF ALL RESPONSES TO PHQ QUESTIONS 1-9: 0
SUM OF ALL RESPONSES TO PHQ QUESTIONS 1-9: 0
4. FEELING TIRED OR HAVING LITTLE ENERGY: 0
2. FEELING DOWN, DEPRESSED OR HOPELESS: 0
SUM OF ALL RESPONSES TO PHQ9 QUESTIONS 1 & 2: 0
SUM OF ALL RESPONSES TO PHQ QUESTIONS 1-9: 0
8. MOVING OR SPEAKING SO SLOWLY THAT OTHER PEOPLE COULD HAVE NOTICED. OR THE OPPOSITE, BEING SO FIGETY OR RESTLESS THAT YOU HAVE BEEN MOVING AROUND A LOT MORE THAN USUAL: 0
3. TROUBLE FALLING OR STAYING ASLEEP: 0
6. FEELING BAD ABOUT YOURSELF - OR THAT YOU ARE A FAILURE OR HAVE LET YOURSELF OR YOUR FAMILY DOWN: 0
5. POOR APPETITE OR OVEREATING: 0
7. TROUBLE CONCENTRATING ON THINGS, SUCH AS READING THE NEWSPAPER OR WATCHING TELEVISION: 0
1. LITTLE INTEREST OR PLEASURE IN DOING THINGS: 0
10. IF YOU CHECKED OFF ANY PROBLEMS, HOW DIFFICULT HAVE THESE PROBLEMS MADE IT FOR YOU TO DO YOUR WORK, TAKE CARE OF THINGS AT HOME, OR GET ALONG WITH OTHER PEOPLE: 0
9. THOUGHTS THAT YOU WOULD BE BETTER OFF DEAD, OR OF HURTING YOURSELF: 0
SUM OF ALL RESPONSES TO PHQ QUESTIONS 1-9: 0

## 2023-07-03 ASSESSMENT — ANXIETY QUESTIONNAIRES
3. WORRYING TOO MUCH ABOUT DIFFERENT THINGS: 0
1. FEELING NERVOUS, ANXIOUS, OR ON EDGE: 0
6. BECOMING EASILY ANNOYED OR IRRITABLE: 0
4. TROUBLE RELAXING: 0
GAD7 TOTAL SCORE: 0
2. NOT BEING ABLE TO STOP OR CONTROL WORRYING: 0
IF YOU CHECKED OFF ANY PROBLEMS ON THIS QUESTIONNAIRE, HOW DIFFICULT HAVE THESE PROBLEMS MADE IT FOR YOU TO DO YOUR WORK, TAKE CARE OF THINGS AT HOME, OR GET ALONG WITH OTHER PEOPLE: NOT DIFFICULT AT ALL
7. FEELING AFRAID AS IF SOMETHING AWFUL MIGHT HAPPEN: 0
5. BEING SO RESTLESS THAT IT IS HARD TO SIT STILL: 0

## 2023-07-03 NOTE — PROGRESS NOTES
Patient:  Jese Garcia  Age:  41 y.o.  :  1975     SEX:  male MRN:  [id number]     RACE: White (non-)     SEEN:  [x]  PATIENT  []  SPOUSE []  OTHER:              PHQ-9  7/3/2023 2023 3/29/2023   Little interest or pleasure in doing things 0 0 0   Little interest or pleasure in doing things - - -   Feeling down, depressed, or hopeless 0 0 0   Trouble falling or staying asleep, or sleeping too much 0 0 0   Trouble falling or staying asleep, or sleeping too much - - -   Feeling tired or having little energy 0 1 0   Feeling tired or having little energy - - -   Poor appetite or overeating 0 0 0   Poor appetite, weight loss, or overeating - - -   Feeling bad about yourself - or that you are a failure or have let yourself or your family down 0 0 0   Feeling bad about yourself - or that you are a failure or have let yourself or your family down - - -   Trouble concentrating on things, such as reading the newspaper or watching television 0 0 0   Trouble concentrating on things such as school, work, reading, or watching TV - - -   Moving or speaking so slowly that other people could have noticed.  Or the opposite - being so fidgety or restless that you have been moving around a lot more than usual 0 0 0   Moving or speaking so slowly that other people could have noticed; or the opposite being so fidgety that others notice - - -   Thoughts that you would be better off dead, or of hurting yourself in some way 0 0 0   Thoughts of being better off dead, or hurting yourself in some way - - -   PHQ-2 Score 0 0 0   Total Score PHQ 2 - - -   PHQ-9 Total Score 0 1 0   PHQ 9 Score - - -   If you checked off any problems, how difficult have these problems made it for you to do your work, take care of things at home, or get along with other people? 0 0 0   How difficult have these problems made it for you to do your work, take care of your home and get along with others - - -       BRENDON-7 SCREENING

## 2023-07-17 ENCOUNTER — TELEPHONE (OUTPATIENT)
Dept: BEHAVIORAL/MENTAL HEALTH CLINIC | Age: 48
End: 2023-07-17

## 2023-07-17 NOTE — TELEPHONE ENCOUNTER
Patient called stating that the medication Concerta is on back order. He only has two pills left. Patient will need a different medication.   Please call patient

## 2023-07-18 DIAGNOSIS — F98.8 ATTENTION DEFICIT DISORDER, UNSPECIFIED HYPERACTIVITY PRESENCE: ICD-10-CM

## 2023-07-18 NOTE — TELEPHONE ENCOUNTER
Spoke to patient. He is going to check around to see if he can locate a pharmacy that may have it in stock. He stated that his pharmacy has advised that there is a national back order and may have difficulty finding it. He will let us know if he can locate a pharmacy. Alert and oriented to person, place and time

## 2023-07-19 ENCOUNTER — OFFICE VISIT (OUTPATIENT)
Dept: PRIMARY CARE CLINIC | Facility: CLINIC | Age: 48
End: 2023-07-19
Payer: COMMERCIAL

## 2023-07-19 ENCOUNTER — TELEPHONE (OUTPATIENT)
Dept: BEHAVIORAL/MENTAL HEALTH CLINIC | Age: 48
End: 2023-07-19

## 2023-07-19 VITALS
WEIGHT: 193 LBS | SYSTOLIC BLOOD PRESSURE: 130 MMHG | HEIGHT: 73 IN | BODY MASS INDEX: 25.58 KG/M2 | TEMPERATURE: 97.5 F | DIASTOLIC BLOOD PRESSURE: 81 MMHG | OXYGEN SATURATION: 99 % | HEART RATE: 84 BPM

## 2023-07-19 DIAGNOSIS — R21 RASH AND NONSPECIFIC SKIN ERUPTION: Primary | ICD-10-CM

## 2023-07-19 DIAGNOSIS — L23.7 CONTACT DERMATITIS DUE TO POISON OAK: ICD-10-CM

## 2023-07-19 PROCEDURE — G8427 DOCREV CUR MEDS BY ELIG CLIN: HCPCS | Performed by: FAMILY MEDICINE

## 2023-07-19 PROCEDURE — G8419 CALC BMI OUT NRM PARAM NOF/U: HCPCS | Performed by: FAMILY MEDICINE

## 2023-07-19 PROCEDURE — 99213 OFFICE O/P EST LOW 20 MIN: CPT | Performed by: FAMILY MEDICINE

## 2023-07-19 PROCEDURE — 1036F TOBACCO NON-USER: CPT | Performed by: FAMILY MEDICINE

## 2023-07-19 PROCEDURE — 96372 THER/PROPH/DIAG INJ SC/IM: CPT | Performed by: FAMILY MEDICINE

## 2023-07-19 RX ORDER — PREDNISONE 5 MG/1
1 TABLET ORAL SEE ADMIN INSTRUCTIONS
Qty: 48 EACH | Refills: 0 | Status: SHIPPED | OUTPATIENT
Start: 2023-07-19

## 2023-07-19 RX ORDER — DEXAMETHASONE SODIUM PHOSPHATE 4 MG/ML
8 INJECTION, SOLUTION INTRA-ARTICULAR; INTRALESIONAL; INTRAMUSCULAR; INTRAVENOUS; SOFT TISSUE ONCE
Status: COMPLETED | OUTPATIENT
Start: 2023-07-19 | End: 2023-07-19

## 2023-07-19 RX ADMIN — DEXAMETHASONE SODIUM PHOSPHATE 8 MG: 4 INJECTION, SOLUTION INTRA-ARTICULAR; INTRALESIONAL; INTRAMUSCULAR; INTRAVENOUS; SOFT TISSUE at 16:12

## 2023-07-19 ASSESSMENT — PATIENT HEALTH QUESTIONNAIRE - PHQ9
6. FEELING BAD ABOUT YOURSELF - OR THAT YOU ARE A FAILURE OR HAVE LET YOURSELF OR YOUR FAMILY DOWN: 0
SUM OF ALL RESPONSES TO PHQ QUESTIONS 1-9: 3
2. FEELING DOWN, DEPRESSED OR HOPELESS: 0
SUM OF ALL RESPONSES TO PHQ QUESTIONS 1-9: 3
9. THOUGHTS THAT YOU WOULD BE BETTER OFF DEAD, OR OF HURTING YOURSELF: 0
SUM OF ALL RESPONSES TO PHQ9 QUESTIONS 1 & 2: 0
10. IF YOU CHECKED OFF ANY PROBLEMS, HOW DIFFICULT HAVE THESE PROBLEMS MADE IT FOR YOU TO DO YOUR WORK, TAKE CARE OF THINGS AT HOME, OR GET ALONG WITH OTHER PEOPLE: 1
5. POOR APPETITE OR OVEREATING: 0
8. MOVING OR SPEAKING SO SLOWLY THAT OTHER PEOPLE COULD HAVE NOTICED. OR THE OPPOSITE, BEING SO FIGETY OR RESTLESS THAT YOU HAVE BEEN MOVING AROUND A LOT MORE THAN USUAL: 0
4. FEELING TIRED OR HAVING LITTLE ENERGY: 1
1. LITTLE INTEREST OR PLEASURE IN DOING THINGS: 0
SUM OF ALL RESPONSES TO PHQ QUESTIONS 1-9: 3
3. TROUBLE FALLING OR STAYING ASLEEP: 1
7. TROUBLE CONCENTRATING ON THINGS, SUCH AS READING THE NEWSPAPER OR WATCHING TELEVISION: 1
SUM OF ALL RESPONSES TO PHQ QUESTIONS 1-9: 3

## 2023-07-19 NOTE — PROGRESS NOTES
[facility] [address] [address] [address], [address] [place] [address]  Phone [phone]  Fax [phone]      Patient: Janak Powers  YOB: 1975  Patient Age 41 y.o. Patient sex: male  Medical Record:  [id number]  Author:  Naye Peter DO    Family Practice Progress Note     Chief Complaint   Patient presents with    Rash     Skin rash and itchiness left foot, left lower leg, abdomen and right arm x 1wk   Rash started  on right side of the abdomen and right arm and is now spreading. History of Present Illness:  Janak Powers is a 41 y.o. male with multiple chronic medical conditions, seen today for sick visit. He states he was on vacation last week and recently returned home from vacation. While on vacation he states that he tried to tie something around the tree and was exposed to poison oak during that time. He started to have rash after that which slowly started to spread throughout the body. He states that first it appeared on his abdomen area and then it started to spread to his left lower leg left foot and now it spread it to his right side of the extremities. He states that it is very itchy and he has been itching a lot since the time the rash started. He has tried over-the-counter itch creams and it has not been helping. He did try aluminum soaking product from pharmacy and that has helped his itching. He also has been taking benadryl but it makes him sleepy and can only take it at night. He states that he does not have any side effects or allergy to prednisone. Denies all other review of system. No other concerns or complaints. Allergies:   Allergies   Allergen Reactions    Escitalopram Other (See Comments)     Headaches     Bupropion Other (See Comments)     headache    Cyclobenzaprine Itching and Other (See Comments)     Also some RLS    Other      cats       Current Medications:   Medications marked \"taking\" at this time:  Current

## 2023-07-19 NOTE — TELEPHONE ENCOUNTER
Patient is unable to refill the Concerta. He has checked around to pharmacies he is able to use and sent the following message        Patient Comment: All of the pharmacies in my area are reporting that this medication has been on a national back order for nearly six months.   The insurance says the alternative(s) covered are Guanfacine HCl ER and Adderall XR     Preferred pharmacy: [facility] [address], [facility] , [address]

## 2023-07-20 ASSESSMENT — ENCOUNTER SYMPTOMS
SINUS PRESSURE: 0
WHEEZING: 0
ABDOMINAL PAIN: 0
SINUS PAIN: 0
BACK PAIN: 0
NAUSEA: 0
CONSTIPATION: 0
RHINORRHEA: 0
CHEST TIGHTNESS: 0
BLOOD IN STOOL: 0
EYE PAIN: 0
VOMITING: 0
EYE REDNESS: 0
SHORTNESS OF BREATH: 0
DIARRHEA: 0
COUGH: 0
SORE THROAT: 0

## 2023-07-25 DIAGNOSIS — F90.2 ATTENTION-DEFICIT HYPERACTIVITY DISORDER, COMBINED TYPE: Primary | ICD-10-CM

## 2023-07-25 RX ORDER — DEXTROAMPHETAMINE SACCHARATE, AMPHETAMINE ASPARTATE MONOHYDRATE, DEXTROAMPHETAMINE SULFATE AND AMPHETAMINE SULFATE 5; 5; 5; 5 MG/1; MG/1; MG/1; MG/1
20 CAPSULE, EXTENDED RELEASE ORAL EVERY MORNING
Qty: 30 CAPSULE | Refills: 0 | Status: SHIPPED | OUTPATIENT
Start: 2023-07-25 | End: 2023-08-24

## 2023-07-26 RX ORDER — METHYLPHENIDATE HYDROCHLORIDE 36 MG/1
36 TABLET ORAL DAILY
Qty: 30 TABLET | Refills: 0 | OUTPATIENT
Start: 2023-07-26 | End: 2023-08-25

## 2023-08-28 ENCOUNTER — TELEPHONE (OUTPATIENT)
Dept: PRIMARY CARE CLINIC | Facility: CLINIC | Age: 48
End: 2023-08-28

## 2023-08-28 DIAGNOSIS — F90.2 ATTENTION-DEFICIT HYPERACTIVITY DISORDER, COMBINED TYPE: ICD-10-CM

## 2023-08-28 RX ORDER — DEXTROAMPHETAMINE SACCHARATE, AMPHETAMINE ASPARTATE MONOHYDRATE, DEXTROAMPHETAMINE SULFATE AND AMPHETAMINE SULFATE 5; 5; 5; 5 MG/1; MG/1; MG/1; MG/1
20 CAPSULE, EXTENDED RELEASE ORAL EVERY MORNING
Qty: 30 CAPSULE | Refills: 0 | Status: SHIPPED | OUTPATIENT
Start: 2023-08-28 | End: 2023-08-28 | Stop reason: SDUPTHER

## 2023-08-28 RX ORDER — DEXTROAMPHETAMINE SACCHARATE, AMPHETAMINE ASPARTATE MONOHYDRATE, DEXTROAMPHETAMINE SULFATE AND AMPHETAMINE SULFATE 5; 5; 5; 5 MG/1; MG/1; MG/1; MG/1
20 CAPSULE, EXTENDED RELEASE ORAL EVERY MORNING
Qty: 30 CAPSULE | Refills: 0 | Status: SHIPPED | OUTPATIENT
Start: 2023-09-27 | End: 2023-10-27

## 2023-08-28 NOTE — TELEPHONE ENCOUNTER
----- Message from Jorge Morrissey sent at 8/28/2023  2:29 PM EDT -----  Subject: Refill Request    QUESTIONS  Name of Medication? amphetamine-dextroamphetamine (ADDERALL XR) 20 MG   extended release capsule  Patient-reported dosage and instructions? one a day  How many days do you have left? 0  [facility]erred [facility]? [id number]  Pharmacy phone number (if available)? [phone]  Additional Information for Provider? Patient was told he only had one   refill available instead of three which wont get him to next appt.  ---------------------------------------------------------------------------  --------------  CALL BACK INFO  What is the best way for the office to contact you? OK to leave message on   voicemail  Preferred Call Back Phone Number? [phone]  ---------------------------------------------------------------------------  --------------  SCRIPT ANSWERS  Relationship to Patient?  Self

## 2023-10-04 ENCOUNTER — TELEMEDICINE (OUTPATIENT)
Dept: BEHAVIORAL/MENTAL HEALTH CLINIC | Age: 48
End: 2023-10-04
Payer: COMMERCIAL

## 2023-10-04 DIAGNOSIS — F98.8 ATTENTION DEFICIT DISORDER, UNSPECIFIED HYPERACTIVITY PRESENCE: ICD-10-CM

## 2023-10-04 DIAGNOSIS — F43.10 PTSD (POST-TRAUMATIC STRESS DISORDER): ICD-10-CM

## 2023-10-04 DIAGNOSIS — G25.81 RESTLESS LEG SYNDROME: ICD-10-CM

## 2023-10-04 DIAGNOSIS — F41.9 ANXIETY DISORDER, UNSPECIFIED TYPE: ICD-10-CM

## 2023-10-04 DIAGNOSIS — F33.0 MILD EPISODE OF RECURRENT MAJOR DEPRESSIVE DISORDER (HCC): Primary | ICD-10-CM

## 2023-10-04 PROCEDURE — G8427 DOCREV CUR MEDS BY ELIG CLIN: HCPCS | Performed by: PSYCHIATRY & NEUROLOGY

## 2023-10-04 PROCEDURE — 99214 OFFICE O/P EST MOD 30 MIN: CPT | Performed by: PSYCHIATRY & NEUROLOGY

## 2023-10-04 RX ORDER — LISDEXAMFETAMINE DIMESYLATE CAPSULES 70 MG/1
70 CAPSULE ORAL DAILY
Qty: 30 CAPSULE | Refills: 0 | Status: SHIPPED | OUTPATIENT
Start: 2023-12-03 | End: 2024-01-02

## 2023-10-04 RX ORDER — LISDEXAMFETAMINE DIMESYLATE CAPSULES 70 MG/1
70 CAPSULE ORAL DAILY
Qty: 30 CAPSULE | Refills: 0 | Status: SHIPPED | OUTPATIENT
Start: 2023-10-04 | End: 2023-10-04 | Stop reason: SDUPTHER

## 2023-10-04 RX ORDER — SERTRALINE HYDROCHLORIDE 100 MG/1
100 TABLET, FILM COATED ORAL DAILY
Qty: 30 TABLET | Refills: 3 | Status: SHIPPED | OUTPATIENT
Start: 2023-10-04 | End: 2023-10-04 | Stop reason: SDUPTHER

## 2023-10-04 RX ORDER — LISDEXAMFETAMINE DIMESYLATE CAPSULES 70 MG/1
70 CAPSULE ORAL DAILY
Qty: 30 CAPSULE | Refills: 0 | Status: SHIPPED | OUTPATIENT
Start: 2023-11-03 | End: 2023-10-04 | Stop reason: SDUPTHER

## 2023-10-04 RX ORDER — GABAPENTIN 300 MG/1
600 CAPSULE ORAL
Qty: 60 CAPSULE | Refills: 3 | Status: SHIPPED | OUTPATIENT
Start: 2023-10-04 | End: 2023-10-04 | Stop reason: SDUPTHER

## 2023-10-04 RX ORDER — SERTRALINE HYDROCHLORIDE 100 MG/1
100 TABLET, FILM COATED ORAL DAILY
Qty: 90 TABLET | Refills: 1 | Status: SHIPPED | OUTPATIENT
Start: 2023-10-04

## 2023-10-04 RX ORDER — GABAPENTIN 300 MG/1
600 CAPSULE ORAL
Qty: 180 CAPSULE | Refills: 1 | Status: SHIPPED | OUTPATIENT
Start: 2023-10-04 | End: 2024-04-01

## 2023-10-04 ASSESSMENT — ANXIETY QUESTIONNAIRES
7. FEELING AFRAID AS IF SOMETHING AWFUL MIGHT HAPPEN: 0
2. NOT BEING ABLE TO STOP OR CONTROL WORRYING: 0
IF YOU CHECKED OFF ANY PROBLEMS ON THIS QUESTIONNAIRE, HOW DIFFICULT HAVE THESE PROBLEMS MADE IT FOR YOU TO DO YOUR WORK, TAKE CARE OF THINGS AT HOME, OR GET ALONG WITH OTHER PEOPLE: SOMEWHAT DIFFICULT
1. FEELING NERVOUS, ANXIOUS, OR ON EDGE: 0
4. TROUBLE RELAXING: 0
6. BECOMING EASILY ANNOYED OR IRRITABLE: 1
5. BEING SO RESTLESS THAT IT IS HARD TO SIT STILL: 3
GAD7 TOTAL SCORE: 4
3. WORRYING TOO MUCH ABOUT DIFFERENT THINGS: 0

## 2023-10-04 ASSESSMENT — PATIENT HEALTH QUESTIONNAIRE - PHQ9
4. FEELING TIRED OR HAVING LITTLE ENERGY: 1
SUM OF ALL RESPONSES TO PHQ QUESTIONS 1-9: 10
SUM OF ALL RESPONSES TO PHQ QUESTIONS 1-9: 10
6. FEELING BAD ABOUT YOURSELF - OR THAT YOU ARE A FAILURE OR HAVE LET YOURSELF OR YOUR FAMILY DOWN: 0
SUM OF ALL RESPONSES TO PHQ QUESTIONS 1-9: 10
7. TROUBLE CONCENTRATING ON THINGS, SUCH AS READING THE NEWSPAPER OR WATCHING TELEVISION: 3
2. FEELING DOWN, DEPRESSED OR HOPELESS: 1
10. IF YOU CHECKED OFF ANY PROBLEMS, HOW DIFFICULT HAVE THESE PROBLEMS MADE IT FOR YOU TO DO YOUR WORK, TAKE CARE OF THINGS AT HOME, OR GET ALONG WITH OTHER PEOPLE: 2
SUM OF ALL RESPONSES TO PHQ9 QUESTIONS 1 & 2: 2
8. MOVING OR SPEAKING SO SLOWLY THAT OTHER PEOPLE COULD HAVE NOTICED. OR THE OPPOSITE, BEING SO FIGETY OR RESTLESS THAT YOU HAVE BEEN MOVING AROUND A LOT MORE THAN USUAL: 3
SUM OF ALL RESPONSES TO PHQ QUESTIONS 1-9: 10
9. THOUGHTS THAT YOU WOULD BE BETTER OFF DEAD, OR OF HURTING YOURSELF: 0
5. POOR APPETITE OR OVEREATING: 1
1. LITTLE INTEREST OR PLEASURE IN DOING THINGS: 1
3. TROUBLE FALLING OR STAYING ASLEEP: 0

## 2023-10-04 NOTE — PROGRESS NOTES
reviewed with the patient today and updated as necessary.      Compliant with medication: Yes   Side effects from medications:  No           No data to display                   EXAMINATION  Musculoskeletal    GAIT AND STATION   [] WNL   [] RESTRICTED   [] UNSTEADY WALK        [] ABNORMAL   [] UNBALANCED  [] WHEELCHAIR/  WALKER/CANE     PSYCHIATRIC    PSYCHOMOTOR   [x]  WNL   [] RETARDATION   [] AGITATION            GENERAL APPEARANCE:   [x]  WELL GROOMED []     DISHEVELED   []  UNKEMPT      []  UNUSUAL/BIZZARE    [] WNL       ATTITUDE:   [x] COOPERATIVE   [] GUARDED   [] SUSPICIOUS      [] HOSTILE                            BEHAVIOR:   [x] CALM   [] HYPERACTIVE   [] MANNERISMS      [] BIZZARE         SPEECH:   [x] NORMAL FOR CLIENT   [] SPONTANEOUS   [] SLURRED   [] WHISPERING      [] LOUD   [] PRESSURED   [] ARTICULATE        EYE CONTACT:   [x] WNL   [] BLANK STARE   [] INTENSE      [] AVOIDANT         MOOD:   [] EUTHYMIC   [x] ANXIOUS   [x] DEPRESSED      [] IRRITABLE   [] ANGRY   [] APATHETIC     AFFECT:   [x] CONGRUENT WITH MOOD   [] FLAT   [] CONSTRICTED      [] INAPPROPRIATE   [] LABILE           THOUGHT PROCESS:   [x] LOGICAL/GOAL-DIRECTED   [] FOI   [] CIRCUMSANTIAL      [] INCOHERENT   [] TANGENTIAL   [] CONCRETE      [] PERSEVERATION           THOUGHT CONTENT:                DELUSIONS  [x] DENIES  [] GRANDIOSE  [] PERSECUTORY  [] Denominational  [] REFERENCE   HALLUCINATIONS  [x] DENIES  [] AUDITORY  [] VISUAL  [] OLFACTORY  [] TACTILE     [] GUSTATORY  [] SOMATIC         OBSESSIONS  [x] DENIES  [] PRESENT         SUICIDAL IDEATION  [x] DENIES  [] PRESENT W/O PLAN  [] PRESENT W/ PLAN       HOMICIDAL IDEATION  [x] DENIES  [] PRESENT W/O PLAN  [] PRESENT W/ PLAN           JUDGEMENT:   [x] GOOD   [] FAIR   [] POOR   INSIGHT:   [x] GOOD   [] FAIR   [] POOR     COGNITION:           SENSORIUM:   [x] ALERT   [] CLOUDED   [] DROWSY     ORIENTATION:   [x] INTACT   [] TIME:  PLACE  PERSON   RECENT & REMOTE MEMORY:   [x]

## 2024-01-08 ENCOUNTER — TELEMEDICINE (OUTPATIENT)
Dept: BEHAVIORAL/MENTAL HEALTH CLINIC | Age: 49
End: 2024-01-08
Payer: COMMERCIAL

## 2024-01-08 DIAGNOSIS — F43.10 PTSD (POST-TRAUMATIC STRESS DISORDER): ICD-10-CM

## 2024-01-08 DIAGNOSIS — F98.8 ATTENTION DEFICIT DISORDER, UNSPECIFIED HYPERACTIVITY PRESENCE: ICD-10-CM

## 2024-01-08 DIAGNOSIS — F33.42 RECURRENT MAJOR DEPRESSIVE DISORDER, IN FULL REMISSION (HCC): Primary | ICD-10-CM

## 2024-01-08 DIAGNOSIS — F41.9 ANXIETY DISORDER, UNSPECIFIED TYPE: ICD-10-CM

## 2024-01-08 PROCEDURE — G8427 DOCREV CUR MEDS BY ELIG CLIN: HCPCS | Performed by: PSYCHIATRY & NEUROLOGY

## 2024-01-08 PROCEDURE — 99214 OFFICE O/P EST MOD 30 MIN: CPT | Performed by: PSYCHIATRY & NEUROLOGY

## 2024-01-08 RX ORDER — LISDEXAMFETAMINE DIMESYLATE CAPSULES 70 MG/1
70 CAPSULE ORAL DAILY
Qty: 30 CAPSULE | Refills: 0 | Status: SHIPPED | OUTPATIENT
Start: 2024-03-08 | End: 2024-04-07

## 2024-01-08 RX ORDER — LISDEXAMFETAMINE DIMESYLATE CAPSULES 70 MG/1
70 CAPSULE ORAL DAILY
Qty: 30 CAPSULE | Refills: 0 | Status: SHIPPED | OUTPATIENT
Start: 2024-02-07 | End: 2024-01-08 | Stop reason: SDUPTHER

## 2024-01-08 RX ORDER — SERTRALINE HYDROCHLORIDE 100 MG/1
150 TABLET, FILM COATED ORAL DAILY
Qty: 135 TABLET | Refills: 1 | Status: SHIPPED | OUTPATIENT
Start: 2024-01-08

## 2024-01-08 RX ORDER — LISDEXAMFETAMINE DIMESYLATE CAPSULES 70 MG/1
70 CAPSULE ORAL DAILY
Qty: 30 CAPSULE | Refills: 0 | Status: SHIPPED | OUTPATIENT
Start: 2024-01-08 | End: 2024-01-08 | Stop reason: SDUPTHER

## 2024-01-08 ASSESSMENT — ANXIETY QUESTIONNAIRES
4. TROUBLE RELAXING: 0
2. NOT BEING ABLE TO STOP OR CONTROL WORRYING: 0
6. BECOMING EASILY ANNOYED OR IRRITABLE: 0
1. FEELING NERVOUS, ANXIOUS, OR ON EDGE: 0
5. BEING SO RESTLESS THAT IT IS HARD TO SIT STILL: 0
IF YOU CHECKED OFF ANY PROBLEMS ON THIS QUESTIONNAIRE, HOW DIFFICULT HAVE THESE PROBLEMS MADE IT FOR YOU TO DO YOUR WORK, TAKE CARE OF THINGS AT HOME, OR GET ALONG WITH OTHER PEOPLE: NOT DIFFICULT AT ALL
3. WORRYING TOO MUCH ABOUT DIFFERENT THINGS: 1
7. FEELING AFRAID AS IF SOMETHING AWFUL MIGHT HAPPEN: 0
GAD7 TOTAL SCORE: 1

## 2024-01-08 ASSESSMENT — PATIENT HEALTH QUESTIONNAIRE - PHQ9
2. FEELING DOWN, DEPRESSED OR HOPELESS: 0
SUM OF ALL RESPONSES TO PHQ QUESTIONS 1-9: 0
6. FEELING BAD ABOUT YOURSELF - OR THAT YOU ARE A FAILURE OR HAVE LET YOURSELF OR YOUR FAMILY DOWN: 0
3. TROUBLE FALLING OR STAYING ASLEEP: 0
10. IF YOU CHECKED OFF ANY PROBLEMS, HOW DIFFICULT HAVE THESE PROBLEMS MADE IT FOR YOU TO DO YOUR WORK, TAKE CARE OF THINGS AT HOME, OR GET ALONG WITH OTHER PEOPLE: 0
7. TROUBLE CONCENTRATING ON THINGS, SUCH AS READING THE NEWSPAPER OR WATCHING TELEVISION: 0
5. POOR APPETITE OR OVEREATING: 0
4. FEELING TIRED OR HAVING LITTLE ENERGY: 0
8. MOVING OR SPEAKING SO SLOWLY THAT OTHER PEOPLE COULD HAVE NOTICED. OR THE OPPOSITE, BEING SO FIGETY OR RESTLESS THAT YOU HAVE BEEN MOVING AROUND A LOT MORE THAN USUAL: 0
SUM OF ALL RESPONSES TO PHQ QUESTIONS 1-9: 0
1. LITTLE INTEREST OR PLEASURE IN DOING THINGS: 0
SUM OF ALL RESPONSES TO PHQ QUESTIONS 1-9: 0
SUM OF ALL RESPONSES TO PHQ9 QUESTIONS 1 & 2: 0
SUM OF ALL RESPONSES TO PHQ QUESTIONS 1-9: 0
9. THOUGHTS THAT YOU WOULD BE BETTER OFF DEAD, OR OF HURTING YOURSELF: 0

## 2024-01-08 NOTE — PROGRESS NOTES
AVERAGE   [] ABOVE AVERAGE   [] BELOW AVERAGE      [] GOOD TO EXCELLENT KNOWLEDGE OF CURRENT EVENTS   [] POOR TO NO KNLEDGE OF CURRENT EVENTS                                          ABNORMAL MOVEMENTS:   [x] NONE   [] TICS   [] TREMORS   [] BIZZARE      [] FACE   [] TRUNK   [] EXTREMETIES   [] GESTURES        SLEEP:   [x] GOOD   [] FAIR   [] POOR      APPETITE:   [x] GOOD   [] POOR   [] ERRATIC       MUSCLE STRENGTH AND TONE   [] WNL   [] ATROPHY   [] SPASTIC        [] FLACCID   [] COGWHEEL       Diagnoses/Impressions:    ICD-10-CM    1. Recurrent major depressive disorder, in full remission (HCC)  F33.42       2. Attention deficit disorder, unspecified hyperactivity presence  F98.8 lisdexamfetamine (VYVANSE) 70 MG capsule      3. PTSD (post-traumatic stress disorder)  F43.10       4. Anxiety disorder, unspecified type  F41.9           TREATMENT GOALS:  Symptom reduction, Medication adherence, maintain therapeutic gains    LABS/IMAGING:    []  Ordered [x]  Reviewed []  New Labs Ordered:     LAB  WBC   Date/Time Value Ref Range Status   08/23/2019 02:02 PM 5.7 3.4 - 10.8 x10E3/uL Final     Hemoglobin   Date/Time Value Ref Range Status   08/23/2019 02:02 PM 15.5 13.0 - 17.7 g/dL Final     Hematocrit   Date/Time Value Ref Range Status   08/23/2019 02:02 PM 45.2 37.5 - 51.0 % Final     Platelets   Date/Time Value Ref Range Status   08/23/2019 02:02  150 - 450 x10E3/uL Final     Sodium   Date/Time Value Ref Range Status   04/30/2021 09:34  134 - 144 mmol/L Final     Potassium   Date/Time Value Ref Range Status   04/30/2021 09:34 AM 4.8 3.5 - 5.2 mmol/L Final     Chloride   Date/Time Value Ref Range Status   04/30/2021 09:34  96 - 106 mmol/L Final     CO2   Date/Time Value Ref Range Status   04/30/2021 09:34 AM 25 20 - 29 mmol/L Final     BUN   Date/Time Value Ref Range Status   04/30/2021 09:34 AM 18 6 - 24 mg/dL Final     ALT   Date/Time Value Ref Range Status   04/30/2021 09:34 AM 24 0 - 44 IU/L

## 2024-03-06 ENCOUNTER — TELEPHONE (OUTPATIENT)
Dept: BEHAVIORAL/MENTAL HEALTH CLINIC | Age: 49
End: 2024-03-06

## 2024-03-06 RX ORDER — SERTRALINE HYDROCHLORIDE 100 MG/1
150 TABLET, FILM COATED ORAL DAILY
Qty: 21 TABLET | Refills: 0 | Status: SHIPPED | OUTPATIENT
Start: 2024-03-06

## 2024-03-06 RX ORDER — SERTRALINE HYDROCHLORIDE 100 MG/1
150 TABLET, FILM COATED ORAL DAILY
Qty: 135 TABLET | Refills: 1 | Status: SHIPPED | OUTPATIENT
Start: 2024-03-06 | End: 2024-03-06 | Stop reason: SDUPTHER

## 2024-03-06 NOTE — TELEPHONE ENCOUNTER
Spoke with patient and he advised that the mail order pharmacy told him it will be about 2 weeks before he receives his medication. He is going to be out before it arrives. He only has 5 tablets left and takes one and a half a day. Would like to know if 2 weeks medication can be sent to the local pharmacy that he can pay cash for until his prescription arrives in the mail for the Zoloft.      Publadam Garcia at [facility].

## 2024-03-14 ENCOUNTER — TELEMEDICINE (OUTPATIENT)
Dept: BEHAVIORAL/MENTAL HEALTH CLINIC | Age: 49
End: 2024-03-14

## 2024-03-14 DIAGNOSIS — F41.9 ANXIETY DISORDER, UNSPECIFIED TYPE: ICD-10-CM

## 2024-03-14 DIAGNOSIS — F98.8 ATTENTION DEFICIT DISORDER, UNSPECIFIED HYPERACTIVITY PRESENCE: ICD-10-CM

## 2024-03-14 DIAGNOSIS — G25.81 RESTLESS LEG SYNDROME: ICD-10-CM

## 2024-03-14 DIAGNOSIS — F43.10 PTSD (POST-TRAUMATIC STRESS DISORDER): ICD-10-CM

## 2024-03-14 DIAGNOSIS — F33.42 RECURRENT MAJOR DEPRESSIVE DISORDER, IN FULL REMISSION (HCC): Primary | ICD-10-CM

## 2024-03-14 PROCEDURE — 99214 OFFICE O/P EST MOD 30 MIN: CPT | Performed by: PSYCHIATRY & NEUROLOGY

## 2024-03-14 RX ORDER — LISDEXAMFETAMINE DIMESYLATE CAPSULES 70 MG/1
70 CAPSULE ORAL DAILY
Qty: 30 CAPSULE | Refills: 0 | Status: SHIPPED | OUTPATIENT
Start: 2024-05-13 | End: 2024-06-12

## 2024-03-14 RX ORDER — LISDEXAMFETAMINE DIMESYLATE CAPSULES 70 MG/1
70 CAPSULE ORAL DAILY
Qty: 30 CAPSULE | Refills: 0 | Status: SHIPPED | OUTPATIENT
Start: 2024-03-14 | End: 2024-03-14 | Stop reason: SDUPTHER

## 2024-03-14 RX ORDER — LISDEXAMFETAMINE DIMESYLATE CAPSULES 70 MG/1
70 CAPSULE ORAL DAILY
Qty: 30 CAPSULE | Refills: 0 | Status: SHIPPED | OUTPATIENT
Start: 2024-04-13 | End: 2024-03-14 | Stop reason: SDUPTHER

## 2024-03-14 RX ORDER — SERTRALINE HYDROCHLORIDE 100 MG/1
200 TABLET, FILM COATED ORAL DAILY
Qty: 60 TABLET | Refills: 3 | Status: SHIPPED | OUTPATIENT
Start: 2024-03-14

## 2024-03-14 ASSESSMENT — PATIENT HEALTH QUESTIONNAIRE - PHQ9
SUM OF ALL RESPONSES TO PHQ QUESTIONS 1-9: 0
SUM OF ALL RESPONSES TO PHQ9 QUESTIONS 1 & 2: 0
3. TROUBLE FALLING OR STAYING ASLEEP: 0
SUM OF ALL RESPONSES TO PHQ QUESTIONS 1-9: 0
SUM OF ALL RESPONSES TO PHQ QUESTIONS 1-9: 0
5. POOR APPETITE OR OVEREATING: 0
2. FEELING DOWN, DEPRESSED OR HOPELESS: 0
1. LITTLE INTEREST OR PLEASURE IN DOING THINGS: 0
10. IF YOU CHECKED OFF ANY PROBLEMS, HOW DIFFICULT HAVE THESE PROBLEMS MADE IT FOR YOU TO DO YOUR WORK, TAKE CARE OF THINGS AT HOME, OR GET ALONG WITH OTHER PEOPLE: 0
SUM OF ALL RESPONSES TO PHQ QUESTIONS 1-9: 0
8. MOVING OR SPEAKING SO SLOWLY THAT OTHER PEOPLE COULD HAVE NOTICED. OR THE OPPOSITE, BEING SO FIGETY OR RESTLESS THAT YOU HAVE BEEN MOVING AROUND A LOT MORE THAN USUAL: 0
9. THOUGHTS THAT YOU WOULD BE BETTER OFF DEAD, OR OF HURTING YOURSELF: 0
4. FEELING TIRED OR HAVING LITTLE ENERGY: 0
7. TROUBLE CONCENTRATING ON THINGS, SUCH AS READING THE NEWSPAPER OR WATCHING TELEVISION: 0
6. FEELING BAD ABOUT YOURSELF - OR THAT YOU ARE A FAILURE OR HAVE LET YOURSELF OR YOUR FAMILY DOWN: 0

## 2024-03-14 ASSESSMENT — ANXIETY QUESTIONNAIRES
2. NOT BEING ABLE TO STOP OR CONTROL WORRYING: 2
6. BECOMING EASILY ANNOYED OR IRRITABLE: 0
1. FEELING NERVOUS, ANXIOUS, OR ON EDGE: 0
IF YOU CHECKED OFF ANY PROBLEMS ON THIS QUESTIONNAIRE, HOW DIFFICULT HAVE THESE PROBLEMS MADE IT FOR YOU TO DO YOUR WORK, TAKE CARE OF THINGS AT HOME, OR GET ALONG WITH OTHER PEOPLE: NOT DIFFICULT AT ALL
7. FEELING AFRAID AS IF SOMETHING AWFUL MIGHT HAPPEN: 0
5. BEING SO RESTLESS THAT IT IS HARD TO SIT STILL: 0
4. TROUBLE RELAXING: 0
GAD7 TOTAL SCORE: 4
3. WORRYING TOO MUCH ABOUT DIFFERENT THINGS: 2

## 2024-03-14 NOTE — PROGRESS NOTES
[address] [address]   confirmed       Total time spent for this encounter: Not billed by time    --Siri García MD on 3/14/2024 at 10:50 AM    An electronic signature was used to authenticate this note.

## 2024-06-07 ENCOUNTER — TELEMEDICINE (OUTPATIENT)
Dept: BEHAVIORAL/MENTAL HEALTH CLINIC | Age: 49
End: 2024-06-07
Payer: COMMERCIAL

## 2024-06-07 DIAGNOSIS — F43.10 PTSD (POST-TRAUMATIC STRESS DISORDER): ICD-10-CM

## 2024-06-07 DIAGNOSIS — F33.42 RECURRENT MAJOR DEPRESSIVE DISORDER, IN FULL REMISSION (HCC): Primary | ICD-10-CM

## 2024-06-07 DIAGNOSIS — F41.9 ANXIETY DISORDER, UNSPECIFIED TYPE: ICD-10-CM

## 2024-06-07 DIAGNOSIS — G25.81 RESTLESS LEG SYNDROME: ICD-10-CM

## 2024-06-07 DIAGNOSIS — F98.8 ATTENTION DEFICIT DISORDER, UNSPECIFIED HYPERACTIVITY PRESENCE: ICD-10-CM

## 2024-06-07 PROCEDURE — 99214 OFFICE O/P EST MOD 30 MIN: CPT | Performed by: PSYCHIATRY & NEUROLOGY

## 2024-06-07 RX ORDER — LISDEXAMFETAMINE DIMESYLATE 70 MG/1
70 CAPSULE ORAL DAILY
Qty: 30 CAPSULE | Refills: 0 | Status: SHIPPED | OUTPATIENT
Start: 2024-07-13 | End: 2024-06-07 | Stop reason: SDUPTHER

## 2024-06-07 RX ORDER — SERTRALINE HYDROCHLORIDE 100 MG/1
200 TABLET, FILM COATED ORAL DAILY
Qty: 60 TABLET | Refills: 3 | Status: SHIPPED | OUTPATIENT
Start: 2024-06-07

## 2024-06-07 RX ORDER — GABAPENTIN 300 MG/1
600 CAPSULE ORAL
Qty: 60 CAPSULE | Refills: 2 | Status: SHIPPED | OUTPATIENT
Start: 2024-06-07 | End: 2024-09-05

## 2024-06-07 RX ORDER — LISDEXAMFETAMINE DIMESYLATE 70 MG/1
70 CAPSULE ORAL DAILY
Qty: 30 CAPSULE | Refills: 0 | Status: SHIPPED | OUTPATIENT
Start: 2024-06-13 | End: 2024-06-07 | Stop reason: SDUPTHER

## 2024-06-07 RX ORDER — LISDEXAMFETAMINE DIMESYLATE 70 MG/1
70 CAPSULE ORAL DAILY
Qty: 30 CAPSULE | Refills: 0 | Status: SHIPPED | OUTPATIENT
Start: 2024-08-12 | End: 2024-09-11

## 2024-06-07 ASSESSMENT — PATIENT HEALTH QUESTIONNAIRE - PHQ9
SUM OF ALL RESPONSES TO PHQ QUESTIONS 1-9: 1
7. TROUBLE CONCENTRATING ON THINGS, SUCH AS READING THE NEWSPAPER OR WATCHING TELEVISION: NOT AT ALL
3. TROUBLE FALLING OR STAYING ASLEEP: NOT AT ALL
5. POOR APPETITE OR OVEREATING: NOT AT ALL
4. FEELING TIRED OR HAVING LITTLE ENERGY: SEVERAL DAYS
8. MOVING OR SPEAKING SO SLOWLY THAT OTHER PEOPLE COULD HAVE NOTICED. OR THE OPPOSITE, BEING SO FIGETY OR RESTLESS THAT YOU HAVE BEEN MOVING AROUND A LOT MORE THAN USUAL: NOT AT ALL
9. THOUGHTS THAT YOU WOULD BE BETTER OFF DEAD, OR OF HURTING YOURSELF: NOT AT ALL
1. LITTLE INTEREST OR PLEASURE IN DOING THINGS: NOT AT ALL
6. FEELING BAD ABOUT YOURSELF - OR THAT YOU ARE A FAILURE OR HAVE LET YOURSELF OR YOUR FAMILY DOWN: NOT AT ALL
2. FEELING DOWN, DEPRESSED OR HOPELESS: NOT AT ALL
10. IF YOU CHECKED OFF ANY PROBLEMS, HOW DIFFICULT HAVE THESE PROBLEMS MADE IT FOR YOU TO DO YOUR WORK, TAKE CARE OF THINGS AT HOME, OR GET ALONG WITH OTHER PEOPLE: NOT DIFFICULT AT ALL
SUM OF ALL RESPONSES TO PHQ QUESTIONS 1-9: 1
SUM OF ALL RESPONSES TO PHQ9 QUESTIONS 1 & 2: 0
SUM OF ALL RESPONSES TO PHQ QUESTIONS 1-9: 1
SUM OF ALL RESPONSES TO PHQ QUESTIONS 1-9: 1

## 2024-06-07 ASSESSMENT — ANXIETY QUESTIONNAIRES
4. TROUBLE RELAXING: NOT AT ALL
3. WORRYING TOO MUCH ABOUT DIFFERENT THINGS: NOT AT ALL
6. BECOMING EASILY ANNOYED OR IRRITABLE: NOT AT ALL
2. NOT BEING ABLE TO STOP OR CONTROL WORRYING: NOT AT ALL
7. FEELING AFRAID AS IF SOMETHING AWFUL MIGHT HAPPEN: NOT AT ALL
1. FEELING NERVOUS, ANXIOUS, OR ON EDGE: NOT AT ALL
GAD7 TOTAL SCORE: 0
IF YOU CHECKED OFF ANY PROBLEMS ON THIS QUESTIONNAIRE, HOW DIFFICULT HAVE THESE PROBLEMS MADE IT FOR YOU TO DO YOUR WORK, TAKE CARE OF THINGS AT HOME, OR GET ALONG WITH OTHER PEOPLE: NOT DIFFICULT AT ALL
5. BEING SO RESTLESS THAT IT IS HARD TO SIT STILL: NOT AT ALL

## 2024-06-07 NOTE — PROGRESS NOTES
Patient:  Jose Sneed  Age:  48 y.o.  :  1975     SEX:  male MRN:  [id number]     RACE: White (non-)     SEEN:  [x]  PATIENT  []  SPOUSE []  OTHER:                  2024    10:00 AM 3/14/2024    10:13 AM 2024     9:41 AM   PHQ-9    Little interest or pleasure in doing things 0 0 0   Feeling down, depressed, or hopeless 0 0 0   Trouble falling or staying asleep, or sleeping too much 0 0 0   Feeling tired or having little energy 1 0 0   Poor appetite or overeating 0 0 0   Feeling bad about yourself - or that you are a failure or have let yourself or your family down 0 0 0   Trouble concentrating on things, such as reading the newspaper or watching television 0 0 0   Moving or speaking so slowly that other people could have noticed. Or the opposite - being so fidgety or restless that you have been moving around a lot more than usual 0 0 0   Thoughts that you would be better off dead, or of hurting yourself in some way 0 0 0   PHQ-2 Score 0 0 0   PHQ-9 Total Score 1 0 0   If you checked off any problems, how difficult have these problems made it for you to do your work, take care of things at home, or get along with other people? 0 0 0           2024    10:01 AM 3/14/2024    10:14 AM 2024     9:42 AM   BRENDON-7 SCREENING   Feeling nervous, anxious, or on edge Not at all Not at all Not at all   Not being able to stop or control worrying Not at all More than half the days Not at all   Worrying too much about different things Not at all More than half the days Several days   Trouble relaxing Not at all Not at all Not at all   Being so restless that it is hard to sit still Not at all Not at all Not at all   Becoming easily annoyed or irritable Not at all Not at all Not at all   Feeling afraid as if something awful might happen Not at all Not at all Not at all   BRENDON-7 Total Score 0 4 1   How difficult have these problems made it for you to do your work, take care of things at